# Patient Record
Sex: FEMALE | Race: WHITE | URBAN - METROPOLITAN AREA
[De-identification: names, ages, dates, MRNs, and addresses within clinical notes are randomized per-mention and may not be internally consistent; named-entity substitution may affect disease eponyms.]

---

## 2017-01-13 ENCOUNTER — OFFICE VISIT (OUTPATIENT)
Dept: ORTHOPEDICS | Facility: CLINIC | Age: 47
End: 2017-01-13

## 2017-01-13 VITALS — BODY MASS INDEX: 47.09 KG/M2 | WEIGHT: 293 LBS | HEIGHT: 66 IN

## 2017-01-13 DIAGNOSIS — M75.121 COMPLETE TEAR OF RIGHT ROTATOR CUFF: Primary | ICD-10-CM

## 2017-01-13 ASSESSMENT — ENCOUNTER SYMPTOMS
CHILLS: 0
NIGHT SWEATS: 0
WEIGHT LOSS: 0
POLYDIPSIA: 0
WEIGHT GAIN: 0
INCREASED ENERGY: 0
FATIGUE: 1
HALLUCINATIONS: 0
ALTERED TEMPERATURE REGULATION: 0
FEVER: 0
DECREASED APPETITE: 0
POLYPHAGIA: 0

## 2017-01-13 NOTE — PROGRESS NOTES
[facility], Orthopaedic Surgery Consultation    Karolyn Finney MRN# [id number]   Age: 46 year old YOB: 1970     Reason for consult: Right shoulder pain       Requesting physician: Dr. Sergo Jaffe         Assessment and Plan:   Assessment:   1. Right supraspinatus and subscapularis rotator cuff tear  2. Right long head biceps disease  3. Morbid obesity    Plan: I reviewed imaging and exam findings with the patient. We discussed that her imaging results demonstrated a tear of the right supraspinatus and a portion of the subscapularis tendon. We discussed the natural history of rotator cuff tears including that they do not heal on their own. Despite this, both non-operative and operative treatment options are available. We discussed the risks and benefits of each. We discussed the postoperative restrictions and rehab course. Given that she has not yet tried formal physical therapy for her right shoulder I would advise a trial of PT. If this does not provide relief of her symptoms we could give further consideration to surgical treatment. Patient expressed understanding of our discussion and all questions were answered. The patient was given a referral for physical therapy and will follow-up in 6 weeks.             History of Present Illness:   Patient was seen and examined by me. History, PMH, Meds, SH, complete ROS (10 organ systems) and PE reviewed with patient and prior medical records.      Mrs. Anthony Finney is a 46-year old female who presents with an approximate 8-month history of right shoulder pain. The patient reports falling alongside a pool at a hotel in June of 2016 after which she began to experience discomfort in her right shoulder. She subsequently consulted with Dr. Sergo Jaffe regarding this shoulder discomfort during a post-operative follow-up for her right knee replacement surgery at which point she was referred to [facility] due to scheduling conflicts and  concern over the patient's smoking status, indicated surgery would not be possible until early spring. Ms. Finney is now presenting here with continued pain and discomfort that is felt most acutely while the patient is lying on her right side at night and with daily activities over the patient's shoulders. The patient takes hydrocodone and uses heat towards pain relief with sub-optimal success and is seeking a more longstanding solution. The patient denies numbness or tingling, new weakness more distally on the right extremity, or sensory changes. She states she has stopped smoking.          Past Medical History:     Past Medical History   Diagnosis Date     Childhood asthma      Localized osteoarthrosis not specified whether primary or secondary, lower leg 10/30/11     Hospitalized     Other complications affecting other specified body systems, NEC      Body mass index 45.0-49.9, adult (H)      Left foot drop 2012     wears  brace             Past Surgical History:     Past Surgical History   Procedure Laterality Date     Release plantar fascia  2007     C neurectomy, intrinsic musculature foot  2007     neuroma removal     Cholecystectomy  2004     Elbow surgery  1986     right elbow     Hc knee scope,med/lat menisectomy  10/28/10     LT       10/28/10     LT     C total knee arthroplasty  10/27/11     LT     Hc hysteroscopy w endometrial bx/polypectomy w/wo d&c  7/11/13             Social History:     Social History     Social History     Marital Status:      Spouse Name: N/A     Number of Children: N/A     Years of Education: N/A     Social History Main Topics     Smoking status: Current Every Day Smoker -- 0.50 packs/day for 35 years     Types: Cigarettes     Last Attempt to Quit: 10/15/2011     Smokeless tobacco: Never Used      Comment:  Starting Chantix tomorrow     Alcohol Use: No     Drug Use: No     Sexual Activity:     Partners: Male     Other Topics Concern     None     Social History  "Narrative             Family History:     Family History   Problem Relation Age of Onset     Family History Negative       Arthritis Father      Asthma Brother      DIABETES Brother      Neurologic Disorder Daughter      Epilepsy     Psychotic Disorder Daughter      Bipolar & Toutette     Asthma Brother      Asthma Sister               Medications:     Current Outpatient Prescriptions   Medication Sig     HYDROCODONE-ACETAMINOPHEN PO      fluticasone (FLOVENT HFA) 110 MCG/ACT inhaler Inhale 2 puffs into the lungs 2 times daily.     albuterol (VENTOLIN HFA) 108 (90 BASE) MCG/ACT inhaler Inhale 1-2 puffs into the lungs every 4 hours as needed for shortness of breath / dyspnea. For shortness of breath/cough.     [DISCONTINUED] albuterol (2.5 MG/3ML) 0.083% nebulizer solution Take 3 mLs by nebulization every 6 hours as needed for shortness of breath / dyspnea.     No current facility-administered medications for this visit.             Allergies:      Allergies   Allergen Reactions     Adhesive Tape Rash     Skin reacts to any adhesive     Morphine      Noted in 7/19/09 ER     Penicillins      Noted in 7/19/09 ER            Review of Systems:   A comprehensive 10 point review of systems (constitutional, ENT, cardiac, peripheral vascular, respiratory, GI, , Musculoskeletal, skin, Neurological) was performed and found to be negative except as described in this note.           Physical Exam:      Ht 1.67 m (5' 5.75\")  Wt 144.38 kg (318 lb 4.8 oz)  BMI 51.77 kg/m2     General: Patient was sitting on exam table in no acute distress, well groomed, and sociable      Musculoskeletal: Right upper extremity: No discoloration, swelling, abrasions, scars, or any gross deformity was noted. In relation to the contralateral upper extremity no asymmetry was observed. Due to body habitus it was difficult to assess for muscle mass or evidence of step-offs. Mild tenderness over the bicipital groove and greater tuberosity. No " tenderness elicited over the AC joint or lesser tuberosity. ROM: Active FE to 80 and passive to 90. ER to 45 and IR to L4. FE and ER strength 4/5 and limited by pain. Positive provocative tests with pain on each: Yergasons, Speeds, empty can, Neer and Steen. Sens intact to Ax/Med/Rad/Uln nerves. Motor intact to EPL, FPL, and Intrinsics.              Data:   XR right shoulder today demonstrates slight irregularity at the greater tuberosity suggestive of rotator cuff disease. The glenohumeral joint is otherwise well preserved. There is mild AC degenerative change.    MRI of the right shoulder done in the [facility] on 11/14/16 reveals a full thickness tear of the supraspinatus with moderate retraction. There may be some high grade partial tearing of the upper rolled border of the subscapularis. The study quality is somewhat limited by patient body habitus. There is long head biceps tendinopathy and tearing.     Answers for HPI/ROS submitted by the patient on 1/13/2017   General Symptoms: Yes  Skin Symptoms: No  HENT Symptoms: No  EYE SYMPTOMS: No  HEART SYMPTOMS: No  LUNG SYMPTOMS: No  INTESTINAL SYMPTOMS: No  URINARY SYMPTOMS: No  GYNECOLOGIC SYMPTOMS: No  BREAST SYMPTOMS: No  SKELETAL SYMPTOMS: No  BLOOD SYMPTOMS: No  NERVOUS SYSTEM SYMPTOMS: No  MENTAL HEALTH SYMPTOMS: No  Fever: No  Loss of appetite: No  Weight loss: No  Weight gain: No  Fatigue: Yes  Night sweats: No  Chills: No  Increased stress: No  Excessive hunger: No  Excessive thirst: No  Feeling hot or cold when others believe the temperature is normal: No  Loss of height: No  Post-operative complications: No  Surgical site pain: No  Hallucinations: No  Change in or Loss of Energy: No  Hyperactivity: No  Confusion: No

## 2017-01-13 NOTE — Clinical Note
1/13/2017       RE: Karolyn Finney   [address]  [address]  [facility] [address]     Dear Colleague,    Thank you for referring your patient, Karolyn Finney, to the [facility] at [facility]. Please see a copy of my visit note below.    No notes on file    Again, thank you for allowing me to participate in the care of your patient.      Sincerely,    Daniela Garcia MD

## 2017-01-13 NOTE — NURSING NOTE
"Reason For Visit:   Chief Complaint   Patient presents with     Consult     Pt. states that she is here today for Right Shoulder Rotator Cuff Tear. DOI: 06/2016, she fell at the hotel. Referring:  LEESA FERNANDES       PCP: Torito Mcmullen  Ref: LEESA FERNANDES    ?  No  Occupation: None.  Date of injury: 06/2016, fall  Date of surgery: None  Smoker: No      Left hand dominant    [facility] Shoulder Score Questionnaire     [facility] Shoulder Assessment 1/13/2017   Affected Shoulder Right   On a scale from zero to 100, how would you rate your RIGHT shoulder today (100 being normal) 10   On a scale from zero to 100, how would you rate your LEFT shoulder today (100 being normal) 100              Ht 1.67 m (5' 5.75\")  Wt 144.38 kg (318 lb 4.8 oz)  BMI 51.77 kg/m2      Pain Assessment  Patient Currently in Pain: Yes  0-10 Pain Scale: 8  Primary Pain Location: Shoulder  Pain Orientation: Right  Pain Descriptors: Discomfort, Aching, Sore, Sharp, Shooting  Alleviating Factors: Pain medication, Rest  Aggravating Factors: Movement, Stretching, Bending            "

## 2017-01-13 NOTE — Clinical Note
1/13/2017      RE: Karolyn Finney   [address]  [address]  [facility] [address]       [facility], Orthopaedic Surgery Consultation    Karolyn Finney MRN# [id number]   Age: 46 year old YOB: 1970     Reason for consult: Right shoulder pain       Requesting physician: Dr. Sergo Jaffe         Assessment and Plan:   Assessment:   1. Right supraspinatus and subscapularis rotator cuff tear  2. Right long head biceps disease  3. Morbid obesity    Plan: I reviewed imaging and exam findings with the patient. We discussed that her imaging results demonstrated a tear of the right supraspinatus and a portion of the subscapularis tendon. We discussed the natural history of rotator cuff tears including that they do not heal on their own. Despite this, both non-operative and operative treatment options are available. We discussed the risks and benefits of each. We discussed the postoperative restrictions and rehab course. Given that she has not yet tried formal physical therapy for her right shoulder I would advise a trial of PT. If this does not provide relief of her symptoms we could give further consideration to surgical treatment. Patient expressed understanding of our discussion and all questions were answered. The patient was given a referral for physical therapy and will follow-up in 6 weeks.             History of Present Illness:   Patient was seen and examined by me. History, PMH, Meds, SH, complete ROS (10 organ systems) and PE reviewed with patient and prior medical records.      Mrs. Anthony Finney is a 46-year old female who presents with an approximate 8-month history of right shoulder pain. The patient reports falling alongside a pool at a hotel in June of 2016 after which she began to experience discomfort in her right shoulder. She subsequently consulted with Dr. Sergo Jaffe regarding this shoulder discomfort during a post-operative follow-up for her right knee  replacement surgery at which point she was referred to [facility] where due to scheduling conflicts and concern over the patient's smoking status, indicated surgery would not be possible until early spring. Ms. Finney is now presenting here with continued pain and discomfort that is felt most acutely while the patient is lying on her right side at night and with daily activities over the patient's shoulders. The patient takes hydrocodone and uses heat towards pain relief with sub-optimal success and is seeking a more longstanding solution. The patient denies numbness or tingling, new weakness more distally on the right extremity, or sensory changes. She states she has stopped smoking.          Past Medical History:     Past Medical History   Diagnosis Date     Childhood asthma      Localized osteoarthrosis not specified whether primary or secondary, lower leg 10/30/11     Hospitalized     Other complications affecting other specified body systems, NEC      Body mass index 45.0-49.9, adult (H)      Left foot drop 2012     wears  brace             Past Surgical History:     Past Surgical History   Procedure Laterality Date     Release plantar fascia  2007     C neurectomy, intrinsic musculature foot  2007     neuroma removal     Cholecystectomy  2004     Elbow surgery  1986     right elbow     Hc knee scope,med/lat menisectomy  10/28/10     LT       10/28/10     LT     C total knee arthroplasty  10/27/11     LT     Hc hysteroscopy w endometrial bx/polypectomy w/wo d&c  7/11/13             Social History:     Social History     Social History     Marital Status:      Spouse Name: N/A     Number of Children: N/A     Years of Education: N/A     Social History Main Topics     Smoking status: Current Every Day Smoker -- 0.50 packs/day for 35 years     Types: Cigarettes     Last Attempt to Quit: 10/15/2011     Smokeless tobacco: Never Used      Comment:  Starting Chantix tomorrow     Alcohol Use: No     Drug Use: No  "    Sexual Activity:     Partners: Male     Other Topics Concern     None     Social History Narrative             Family History:     Family History   Problem Relation Age of Onset     Family History Negative       Arthritis Father      Asthma Brother      DIABETES Brother      Neurologic Disorder Daughter      Epilepsy     Psychotic Disorder Daughter      Bipolar & Toutette     Asthma Brother      Asthma Sister               Medications:     Current Outpatient Prescriptions   Medication Sig     HYDROCODONE-ACETAMINOPHEN PO      fluticasone (FLOVENT HFA) 110 MCG/ACT inhaler Inhale 2 puffs into the lungs 2 times daily.     albuterol (VENTOLIN HFA) 108 (90 BASE) MCG/ACT inhaler Inhale 1-2 puffs into the lungs every 4 hours as needed for shortness of breath / dyspnea. For shortness of breath/cough.     [DISCONTINUED] albuterol (2.5 MG/3ML) 0.083% nebulizer solution Take 3 mLs by nebulization every 6 hours as needed for shortness of breath / dyspnea.     No current facility-administered medications for this visit.             Allergies:      Allergies   Allergen Reactions     Adhesive Tape Rash     Skin reacts to any adhesive     Morphine      Noted in 7/19/09 ER     Penicillins      Noted in 7/19/09 ER            Review of Systems:   A comprehensive 10 point review of systems (constitutional, ENT, cardiac, peripheral vascular, respiratory, GI, , Musculoskeletal, skin, Neurological) was performed and found to be negative except as described in this note.           Physical Exam:      Ht 1.67 m (5' 5.75\")  Wt 144.38 kg (318 lb 4.8 oz)  BMI 51.77 kg/m2     General: Patient was sitting on exam table in no acute distress, well groomed, and sociable      Musculoskeletal: Right upper extremity: No discoloration, swelling, abrasions, scars, or any gross deformity was noted. In relation to the contralateral upper extremity no asymmetry was observed. Due to body habitus it was difficult to assess for muscle mass or evidence " of step-offs. Mild tenderness over the bicipital groove and greater tuberosity. No tenderness elicited over the AC joint or lesser tuberosity. ROM: Active FE to 80 and passive to 90. ER to 45 and IR to L4. FE and ER strength 4/5 and limited by pain. Positive provocative tests with pain on each: Yergasons, Speeds, empty can, Neer and Steen. Sens intact to Ax/Med/Rad/Uln nerves. Motor intact to EPL, FPL, and Intrinsics.              Data:   XR right shoulder today demonstrates slight irregularity at the greater tuberosity suggestive of rotator cuff disease. The glenohumeral joint is otherwise well preserved. There is mild AC degenerative change.    MRI of the right shoulder done in the [facility] system on 11/14/16 reveals a full thickness tear of the supraspinatus with moderate retraction. There may be some high grade partial tearing of the upper rolled border of the subscapularis. The study quality is somewhat limited by patient body habitus. There is long head biceps tendinopathy and tearing.     Answers for HPI/ROS submitted by the patient on 1/13/2017   General Symptoms: Yes  Skin Symptoms: No  HENT Symptoms: No  EYE SYMPTOMS: No  HEART SYMPTOMS: No  LUNG SYMPTOMS: No  INTESTINAL SYMPTOMS: No  URINARY SYMPTOMS: No  GYNECOLOGIC SYMPTOMS: No  BREAST SYMPTOMS: No  SKELETAL SYMPTOMS: No  BLOOD SYMPTOMS: No  NERVOUS SYSTEM SYMPTOMS: No  MENTAL HEALTH SYMPTOMS: No  Fever: No  Loss of appetite: No  Weight loss: No  Weight gain: No  Fatigue: Yes  Night sweats: No  Chills: No  Increased stress: No  Excessive hunger: No  Excessive thirst: No  Feeling hot or cold when others believe the temperature is normal: No  Loss of height: No  Post-operative complications: No  Surgical site pain: No  Hallucinations: No  Change in or Loss of Energy: No  Hyperactivity: No  Confusion: No    Daniela Rhonda Garcia MD

## 2017-01-27 ENCOUNTER — TRANSFERRED RECORDS (OUTPATIENT)
Dept: HEALTH INFORMATION MANAGEMENT | Facility: CLINIC | Age: 47
End: 2017-01-27

## 2017-05-24 ENCOUNTER — OFFICE VISIT (OUTPATIENT)
Dept: ORTHOPEDICS | Facility: CLINIC | Age: 47
End: 2017-05-24

## 2017-05-24 VITALS — BODY MASS INDEX: 47.09 KG/M2 | WEIGHT: 293 LBS | HEIGHT: 66 IN

## 2017-05-24 DIAGNOSIS — M75.121 COMPLETE TEAR OF RIGHT ROTATOR CUFF: Primary | ICD-10-CM

## 2017-05-24 NOTE — LETTER
5/24/2017       RE: Karolyn Finney   [address]  [address]  [facility] [address]     Dear Colleague,    Thank you for referring your patient, Karolyn Finney, to the [facility] at [facility]. Please see a copy of my visit note below.    CHIEF COMPLAINT:  Right rotator cuff tear.      HISTORY OF PRESENT ILLNESS:  Ms. Anthony Finney is a 46-year-old left-hand-dominant female whom I am seeing today for her ongoing right shoulder pain.  She has a full-thickness supraspinatus tear, and has tried physical therapy.  When I saw her last in January, she then did 8 weeks of physical therapy, including home exercises.  She feels that she continues to be limited.  She has pain with away-from-body or overhead activities and, in fact, her overhead function is rather limited.  She is interested in considering surgical intervention.  She provides care for her grandson, who is with her again today.      PHYSICAL EXAMINATION:     GENERAL:  On examination today, the patient is a pleasant adult female in no acute distress.  BMI today is 52.16.   RESPIRATIONS:  Even and unlabored.   FOCUSED UPPER EXTREMITY EXAM:  Inspection of the right upper extremity reveals a morbidly obese body habitus.  The patient has tenderness over the anterior shoulder.  She has no focal tenderness over the AC joint.  Her right shoulder active range of motion today is forward elevation to 85 and passive forward elevation to 95.  Her external rotation reaches 55.  She has internal rotation only to the gluteal level.  She has positive provocative tests diffusely, including empty can, Neer, Steen' and Speed's.  She is neurovascularly intact without deficits.      IMAGING:  I reviewed the MRI of the right shoulder dated 11/14/2016.  This study again demonstrates a full-thickness rotator cuff tear of the supraspinatus with mild to moderate atrophy.  There is high-grade partial tearing of  the subscapularis.  There is also tendinopathy and longitudinal tearing of the long head of the biceps.   ASSESSMENT:   1.  Right full-thickness supraspinatus and partial-thickness subscapularis tears.   2.  Right long head biceps disease.   3.  Morbid obesity with a BMI of 52.16.      RECOMMENDATIONS:  I reviewed with Ms. Anthony Finney the MRI findings again.  I discussed with her that when non-operative treatment is not successful in improving pain in the setting of a rotator cuff tear, consideration can be given to surgical intervention.  I discussed with her my concerns for both the medical and surgical risks of a rotator cuff repair given her obesity.  I outlined for her also that a rotator cuff repair is not a guarantee for full range of motion or strength.  We discussed that if surgery were to be pursued, I would include arthroscopic rotator cuff repair and biceps tenotomy.  I advised her that I think it would be reasonable for her to be seen in the PAC Clinic, as airway management and ventilation are more difficult in the setting of morbid obesity.  I also discussed with her the nature of the postoperative restrictions and recovery.  She expressed understanding.  She would like to proceed with surgical intervention.  We will pursue a preop H&P and evaluation in PAC.     Daniela Garcia MD

## 2017-05-24 NOTE — MR AVS SNAPSHOT
"              After Visit Summary   5/24/2017    Karolyn Finney    MRN: [id number]           Patient Information     Date Of Birth          1970        Visit Information        Provider Department      5/24/2017 12:30 PM Daniela Garcia MD [facility]        Today's Diagnoses     Complete tear of right rotator cuff    -  1       Follow-ups after your visit        Who to contact     Please call your clinic at [phone] to:    Ask questions about your health    Make or cancel appointments    Discuss your medicines    Learn about your test results    Speak to your doctor   If you have compliments or concerns about an experience at your clinic, or if you wish to file a complaint, please contact [facility] Patient Relations at [phone] or email us at [email or web address]         Additional Information About Your Visit        MyChart Information     [employer] gives you secure access to your electronic health record. If you see a primary care provider, you can also send messages to your care team and make appointments. If you have questions, please call your primary care clinic.  If you do not have a primary care provider, please call [phone] and they will assist you.      [employer] is an electronic gateway that provides easy, online access to your medical records. With [employer], you can request a clinic appointment, read your test results, renew a prescription or communicate with your care team.     To access your existing account, please contact your [facility] or call [phone] for assistance.        Care EveryWhere ID     This is your Care EveryWhere ID. This could be used by other organizations to access your [facility] medical records  [id number]        Your Vitals Were     Height BMI (Body Mass Index)                1.67 m (5' 5.75\") 52.16 kg/m2           Blood Pressure from Last 3 Encounters: "   03/15/14 138/57   07/10/13 118/70   07/10/13 114/70    Weight from Last 3 Encounters:   05/24/17 (!) 145.5 kg (320 lb 11.2 oz)   01/13/17 (!) 144.4 kg (318 lb 4.8 oz)   07/10/13 136.1 kg (300 lb)              We Performed the Following     Juanita-Operative Worksheet        Primary Care Provider Office Phone # Fax #    Torito Jamil Mcmullen [phone]       [facility] [address]  [place] [address]        Thank you!     Thank you for choosing [facility]  for your care. Our goal is always to provide you with excellent care. Hearing back from our patients is one way we can continue to improve our services. Please take a few minutes to complete the written survey that you may receive in the mail after your visit with us. Thank you!             Your Updated Medication List - Protect others around you: Learn how to safely use, store and throw away your medicines at www.disposemymeds.org.          This list is accurate as of: 5/24/17 11:59 PM.  Always use your most recent med list.                   Brand Name Dispense Instructions for use    albuterol 108 (90 BASE) MCG/ACT Inhaler    VENTOLIN HFA    1 Inhaler    Inhale 1-2 puffs into the lungs every 4 hours as needed for shortness of breath / dyspnea. For shortness of breath/cough.       fluticasone 110 MCG/ACT Inhaler    FLOVENT HFA    1 Inhaler    Inhale 2 puffs into the lungs 2 times daily.       HYDROCODONE-ACETAMINOPHEN PO      Take by mouth every 6 hours as needed

## 2017-05-24 NOTE — NURSING NOTE
"Reason For Visit:   Chief Complaint   Patient presents with     Shoulder Pain     Follow up right shoulder pain.        PCP: Torito Mcmullen  Ref: LEESA FERNANDES     ?  No  Date of injury: 06/2016, fall  Date of surgery: None  Smoker: No        Left hand dominant    SANE score  Affected shoulder: Right  Right shoulder SANE: 25  Left shoulder SANE: 100    Ht 1.67 m (5' 5.75\")  Wt (!) 145.5 kg (320 lb 11.2 oz)  BMI 52.16 kg/m2      Pain Assessment  Patient Currently in Pain: Yes  0-10 Pain Scale: 6  Primary Pain Location: Shoulder  Pain Orientation: Right  Alleviating Factors: Other (comment) (Nothing)  Aggravating Factors: Other (comment), Reaching (Lifting, reaching behind back)        "

## 2017-05-24 NOTE — PROGRESS NOTES
CHIEF COMPLAINT:  Right rotator cuff tear.      HISTORY OF PRESENT ILLNESS:  Ms. Anthony Finney is a 46-year-old left-hand-dominant female whom I am seeing today for her ongoing right shoulder pain.  She has a full-thickness supraspinatus tear, and has tried physical therapy.  When I saw her last in January, she then did 8 weeks of physical therapy, including home exercises.  She feels that she continues to be limited.  She has pain with away-from-body or overhead activities and, in fact, her overhead function is rather limited.  She is interested in considering surgical intervention.  She provides care for her grandson, who is with her again today.      PHYSICAL EXAMINATION:     GENERAL:  On examination today, the patient is a pleasant adult female in no acute distress.  BMI today is 52.16.   RESPIRATIONS:  Even and unlabored.   FOCUSED UPPER EXTREMITY EXAM:  Inspection of the right upper extremity reveals a morbidly obese body habitus.  The patient has tenderness over the anterior shoulder.  She has no focal tenderness over the AC joint.  Her right shoulder active range of motion today is forward elevation to 85 and passive forward elevation to 95.  Her external rotation reaches 55.  She has internal rotation only to the gluteal level.  She has positive provocative tests diffusely, including empty can, Neer, Steen' and Speed's.  She is neurovascularly intact without deficits.      IMAGING:  I reviewed the MRI of the right shoulder dated 11/14/2016.  This study again demonstrates a full-thickness rotator cuff tear of the supraspinatus with mild to moderate atrophy.  There is high-grade partial tearing of the subscapularis.  There is also tendinopathy and longitudinal tearing of the long head of the biceps.      ASSESSMENT:   1.  Right full-thickness supraspinatus and partial-thickness subscapularis tears.   2.  Right long head biceps disease.   3.  Morbid obesity with a BMI of 52.16.      RECOMMENDATIONS:  I  reviewed with Ms. Anthony Reg the MRI findings again.  I discussed with her that when non-operative treatment is not successful in improving pain in the setting of a rotator cuff tear, consideration can be given to surgical intervention.  I discussed with her my concerns for both the medical and surgical risks of a rotator cuff repair given her obesity.  I outlined for her also that a rotator cuff repair is not a guarantee for full range of motion or strength.  We discussed that if surgery were to be pursued, I would include arthroscopic rotator cuff repair and biceps tenotomy.  I advised her that I think it would be reasonable for her to be seen in the PAC Clinic, as airway management and ventilation are more difficult in the setting of morbid obesity.  I also discussed with her the nature of the postoperative restrictions and recovery.  She expressed understanding.  She would like to proceed with surgical intervention.  We will pursue a preop H&P and evaluation in PAC.

## 2017-05-24 NOTE — LETTER
5/24/2017      RE: Karolyn Finney   [address]  [address]  [facility] [address]     CHIEF COMPLAINT:  Right rotator cuff tear.      HISTORY OF PRESENT ILLNESS:  Ms. Anthony Finney is a 46-year-old left-hand-dominant female whom I am seeing today for her ongoing right shoulder pain.  She has a full-thickness supraspinatus tear, and has tried physical therapy.  When I saw her last in January, she then did 8 weeks of physical therapy, including home exercises.  She feels that she continues to be limited.  She has pain with away-from-body or overhead activities and, in fact, her overhead function is rather limited.  She is interested in considering surgical intervention.  She provides care for her grandson, who is with her again today.      PHYSICAL EXAMINATION:     GENERAL:  On examination today, the patient is a pleasant adult female in no acute distress.  BMI today is 52.16.   RESPIRATIONS:  Even and unlabored.   FOCUSED UPPER EXTREMITY EXAM:  Inspection of the right upper extremity reveals a morbidly obese body habitus.  The patient has tenderness over the anterior shoulder.  She has no focal tenderness over the AC joint.  Her right shoulder active range of motion today is forward elevation to 85 and passive forward elevation to 95.  Her external rotation reaches 55.  She has internal rotation only to the gluteal level.  She has positive provocative tests diffusely, including empty can, Neer, Steen' and Speed's.  She is neurovascularly intact without deficits.      IMAGING:  I reviewed the MRI of the right shoulder dated 11/14/2016.  This study again demonstrates a full-thickness rotator cuff tear of the supraspinatus with mild to moderate atrophy.  There is high-grade partial tearing of the subscapularis.  There is also tendinopathy and longitudinal tearing of the long head of the biceps.      ASSESSMENT:   1.  Right full-thickness supraspinatus and partial-thickness subscapularis tears.   2.  Right  long head biceps disease.   3.  Morbid obesity with a BMI of 52.16.   RECOMMENDATIONS:  I reviewed with Ms. Anthony Finney the MRI findings again.  I discussed with her that when non-operative treatment is not successful in improving pain in the setting of a rotator cuff tear, consideration can be given to surgical intervention.  I discussed with her my concerns for both the medical and surgical risks of a rotator cuff repair given her obesity.  I outlined for her also that a rotator cuff repair is not a guarantee for full range of motion or strength.  We discussed that if surgery were to be pursued, I would include arthroscopic rotator cuff repair and biceps tenotomy.  I advised her that I think it would be reasonable for her to be seen in the PAC Clinic, as airway management and ventilation are more difficult in the setting of morbid obesity.  I also discussed with her the nature of the postoperative restrictions and recovery.  She expressed understanding.  She would like to proceed with surgical intervention.  We will pursue a preop H&P and evaluation in PAC.     Daniela Garcia MD

## 2017-05-24 NOTE — NURSING NOTE
Teaching Flowsheet   Relevant Diagnosis: Rotator cuff tear  Teaching Topic: Pre-op for right arthroscopic rotator cuff repair, biceps tenotomy, subacromial decompression - patient will call to schedule     Person(s) involved in teaching:   Patient  Daughter     Motivation Level:  Asks Questions: Yes  Cooperative: Yes  Receptive (willing/able to accept information): Yes  Any cultural factors/Rastafari beliefs that may influence understanding or compliance? No     Patient demonstrates understanding of the following:  Reason for the appointment, diagnosis and treatment plan: Yes  Knowledge of proper use of medications and conditions for which they are ordered (with special attention to potential side effects or drug interactions): Yes  Which situations necessitate calling provider and whom to contact: Yes    PAC for anesthesia (obesity) and H&P   and daughter will assist with transportation and cares after surgery     Proper use and care of sling x 6 weeks (medical equip, care aids, etc.): Yes  Nutritional needs and diet plan: Yes  Pain management techniques: Yes  Wound Care: Yes  How and/when to access community resources: Yes     Instructional Materials Used/Given: Pre-op packet, surgical soap, written guidelines for care after rotator cuff repair     Time spent with patient: 15.

## 2017-06-12 ENCOUNTER — APPOINTMENT (OUTPATIENT)
Dept: SURGERY | Facility: CLINIC | Age: 47
End: 2017-06-12

## 2017-06-12 ENCOUNTER — OFFICE VISIT (OUTPATIENT)
Dept: SURGERY | Facility: CLINIC | Age: 47
End: 2017-06-12

## 2017-06-12 DIAGNOSIS — Z01.818 PREOP GENERAL PHYSICAL EXAM: Primary | ICD-10-CM

## 2017-06-12 DIAGNOSIS — N92.0 MENORRHAGIA: Primary | ICD-10-CM

## 2017-06-13 ENCOUNTER — ANESTHESIA EVENT (OUTPATIENT)
Dept: SURGERY | Facility: CLINIC | Age: 47
End: 2017-06-13

## 2017-06-13 VITALS
SYSTOLIC BLOOD PRESSURE: 155 MMHG | BODY MASS INDEX: 48.82 KG/M2 | HEIGHT: 65 IN | HEART RATE: 96 BPM | WEIGHT: 293 LBS | DIASTOLIC BLOOD PRESSURE: 90 MMHG | TEMPERATURE: 98 F

## 2017-06-13 LAB
ANION GAP SERPL CALCULATED.3IONS-SCNC: 8 MMOL/L (ref 3–14)
BUN SERPL-MCNC: 11 MG/DL (ref 7–30)
CALCIUM SERPL-MCNC: 9.2 MG/DL (ref 8.5–10.1)
CHLORIDE SERPL-SCNC: 105 MMOL/L (ref 94–109)
CO2 SERPL-SCNC: 28 MMOL/L (ref 20–32)
CREAT SERPL-MCNC: 0.71 MG/DL (ref 0.52–1.04)
ERYTHROCYTE [DISTWIDTH] IN BLOOD BY AUTOMATED COUNT: 13.2 % (ref 10–15)
GFR SERPL CREATININE-BSD FRML MDRD: 88 ML/MIN/1.7M2
GLUCOSE SERPL-MCNC: 112 MG/DL (ref 70–99)
HCT VFR BLD AUTO: 46.5 % (ref 35–47)
HGB BLD-MCNC: 14.6 G/DL (ref 11.7–15.7)
MCH RBC QN AUTO: 29.2 PG (ref 26.5–33)
MCHC RBC AUTO-ENTMCNC: 31.4 G/DL (ref 31.5–36.5)
MCV RBC AUTO: 93 FL (ref 78–100)
PLATELET # BLD AUTO: 302 10E9/L (ref 150–450)
POTASSIUM SERPL-SCNC: 3.7 MMOL/L (ref 3.4–5.3)
RBC # BLD AUTO: 5 10E12/L (ref 3.8–5.2)
SODIUM SERPL-SCNC: 141 MMOL/L (ref 133–144)
WBC # BLD AUTO: 7.3 10E9/L (ref 4–11)

## 2017-06-13 ASSESSMENT — LIFESTYLE VARIABLES: TOBACCO_USE: 1

## 2017-06-13 NOTE — H&P
Pre-Operative H & P     CC:  Preoperative exam to assess for increased cardiopulmonary risk while undergoing surgery and anesthesia.    Date of Encounter: 6/12/2017  Primary Care Physician:  Torito Mcmullen    HPI  Karolyn JEAN Anthony Finney is a 46 year old female who presents for pre-operative H & P in preparation for Right Arthroscopic Rotator Cuff Repair, Biceps Tenotomy, Subacromial Decompression  with Dr. Garcia on 7/7/2017 at [facility].     History is obtained from the patient.     Past Medical History  Past Medical History:   Diagnosis Date     Body mass index 50.0-59.9, adult (H)      Intermittent asthma      Left foot drop 2012    wears  brace     Localized osteoarthrosis not specified whether primary or secondary, lower leg 10/30/11    Hospitalized     Other complications affecting other specified body systems, NEC        Past Surgical History  Past Surgical History:   Procedure Laterality Date     C NEURECTOMY, INTRINSIC MUSCULATURE FOOT  2007    neuroma removal     C TOTAL KNEE ARTHROPLASTY  10/27/11    LT     CHOLECYSTECTOMY  2004     ELBOW SURGERY  1986    right elbow       10/28/10    LT     HC HYSTEROSCOPY W ENDOMETRIAL BX/POLYPECTOMY W/WO D&C  7/11/13     HC KNEE SCOPE,MED/LAT MENISECTOMY  10/28/10    LT     RELEASE PLANTAR FASCIA  2007       Hx of Blood transfusions/reactions: none     Hx of abnormal bleeding or anti-platelet use: none    Menstrual history: Patient's last menstrual period was 06/14/2013.: hysterectomy     Steroid use in the last year: none    Personal or FH with difficulty with Anesthesia:  None        Prior to Admission Medications  Current Outpatient Prescriptions   Medication Sig Dispense Refill     HYDROCODONE-ACETAMINOPHEN PO Take by mouth every 6 hours as needed        fluticasone (FLOVENT HFA) 110 MCG/ACT inhaler Inhale 2 puffs into the lungs 2 times daily. 1 Inhaler 11     albuterol (VENTOLIN HFA) 108 (90 BASE) MCG/ACT  inhaler Inhale 1-2 puffs into the lungs every 4 hours as needed for shortness of breath / dyspnea. For shortness of breath/cough. 1 Inhaler 1       Allergies  Allergies   Allergen Reactions     Adhesive Tape Rash     Skin reacts to any adhesive     Morphine      Noted in 7/19/09 ER     Penicillins      Noted in 7/19/09 ER     Tramadol Itching       Social History  Social History     Social History     Marital status:      Spouse name: N/A     Number of children: N/A     Years of education: N/A     Occupational History     Not on file.     Social History Main Topics     Smoking status: Former Smoker     Packs/day: 0.50     Years: 35.00     Types: Cigarettes     Quit date: 4/1/2017     Smokeless tobacco: Never Used      Comment:  Starting Chantix tomorrow     Alcohol use No     Drug use: No     Sexual activity: Yes     Partners: Male     Other Topics Concern     Not on file     Social History Narrative       Family History  Family History   Problem Relation Age of Onset     Family History Negative       Arthritis Father      Asthma Brother      DIABETES Brother      Neurologic Disorder Daughter      Epilepsy     Psychotic Disorder Daughter      Bipolar & Toutette     Asthma Brother      Asthma Sister          Anesthesia Evaluation     . Pt has had prior anesthetic. Type: General and MAC           ROS/MED HX    ENT/Pulmonary:     (+)GURJIT risk factors snores loudly, obese, tobacco use, Past use 0.5 PPD for  packs/day  Intermittent asthma Treatment: Inhaler prn,  , . .    Neurologic:  - neg neurologic ROS     Cardiovascular:  - neg cardiovascular ROS   (+) ----. : . . . :. . No previous cardiac testing       METS/Exercise Tolerance:  3 - Able to walk 1-2 blocks without stopping   Hematologic:  - neg hematologic  ROS       Musculoskeletal:   (+) , , other musculoskeletal- right shoulder pain, impaired gait due to muscle injury      GI/Hepatic:  - neg GI/hepatic ROS       Renal/Genitourinary:  - ROS Renal section  "negative       Endo:     (+) Obesity, .      Psychiatric:  - neg psychiatric ROS       Infectious Disease:  - neg infectious disease ROS       Malignancy:      - no malignancy   Other:    (+) No chance of pregnancy C-spine cleared: N/A, no H/O Chronic Pain,no other significant disability   - neg other ROS         Physical Exam  Normal systems: cardiovascular, pulmonary and dental    Airway   Feasible airway    Dental   Normal      Cardiovascular   Rhythm and rate: regular and normal      Pulmonary    breath sounds clear to auscultation               The complete review of systems is negative other than noted in the HPI or here.   Temp: 98  F (36.7  C) Temp src: Oral BP: 155/90 Pulse: 96               323 lbs 14.4 oz  5' 5\"   Body mass index is 53.9 kg/(m^2).       Physical Exam  Constitutional: Awake, alert, cooperative, no apparent distress, and appears stated age.  Eyes: Pupils equal, round and reactive to light, extra ocular muscles intact, sclera clear, conjunctiva normal.  HENT: Normocephalic, oral pharynx with moist mucus membranes, good dentition. No goiter appreciated.   Respiratory: Clear to auscultation bilaterally, no crackles or wheezing.  Cardiovascular: Regular rate and rhythm, normal S1 and S2, and no murmur noted.  Carotids +2, no bruits. Bilateral LE edema. Palpable pulses to radial  DP and PT arteries.   GI: Normal bowel sounds, soft, non-distended, non-tender, no masses palpated, no hepatosplenomegaly.    Lymph/Hematologic: No cervical lymphadenopathy and no supraclavicular lymphadenopathy.  Genitourinary:  Deferred  Skin: Warm and dry.  No rashes at anticipated surgical site.   Musculoskeletal: Full ROM of neck. There is no redness, warmth, or swelling of the joints.    Neurologic: Awake, alert, oriented to name, place and time. Cranial nerves II-XII are grossly intact. Gait is slow, requires cane for long distances. Right arm weakness  Neuropsychiatric: Calm, cooperative. Normal affect. "     Labs: (personally reviewed)  Lab Results   Component Value Date    WBC 7.3 06/12/2017     Lab Results   Component Value Date    RBC 5.00 06/12/2017     Lab Results   Component Value Date    HGB 14.6 06/12/2017     Lab Results   Component Value Date    HCT 46.5 06/12/2017     No components found for: MCT  Lab Results   Component Value Date    MCV 93 06/12/2017     Lab Results   Component Value Date    MCH 29.2 06/12/2017     Lab Results   Component Value Date    MCHC 31.4 06/12/2017     Lab Results   Component Value Date    RDW 13.2 06/12/2017     Lab Results   Component Value Date     06/12/2017       Last Basic Metabolic Panel:  Lab Results   Component Value Date     06/12/2017      Lab Results   Component Value Date    POTASSIUM 3.7 06/12/2017     Lab Results   Component Value Date    CHLORIDE 105 06/12/2017     Lab Results   Component Value Date    SCARLETT 9.2 06/12/2017     Lab Results   Component Value Date    CO2 28 06/12/2017     Lab Results   Component Value Date    BUN 11 06/12/2017     Lab Results   Component Value Date    CR 0.71 06/12/2017     Lab Results   Component Value Date     06/12/2017       EKG: Personally reviewed but formal cardiology read pending: not indicated          ASSESSMENT and PLAN  Karolyn Finney is a 46 year old female scheduled to undergo Right Arthroscopic Rotator Cuff Repair, Biceps Tenotomy, Subacromial Decompression. She has the following specific operative considerations:   - RCRI : Low serious cardiac risks.  0.4% risk of major adverse cardiac event.   - Anesthesia considerations:  Refer to PAC assessment in anesthesia records  - VTE risk: 0.5% due to BMI  - GURJIT # of risks 4/8 = intermediate risk  - Post-op delirium risk: low risk   - Risk of PONV score = 2.  If > 2, anti-emetic intervention recommended.       Previous anesthesia without complications  1) Cardiac: No known cardiac diagnosis or symptoms.  2) Pulmonary: Intermittent asthma, uses  inhalers in the Spring and Fall. Smoked 0.5 PPD for 15 years, quit 4/2017  3) MSK: ongoing right shoulder pain as a result of Right full-thickness supraspinatus and partial-thickness subscapularis tears  4) Endo: BMI 52.27    Feasible airway. Activity is limited by bilateral LE muscle injury and left foot drop. Will use cane for long distances.    Pt optimized for surgery. AVS with information on surgery time/arrival time, meds and NPO status given by nursing staff      Patient was discussed with Dr Patton.    MILTON Vance CNS  Preoperative Assessment Center  [facility]  Clinic and Surgery Center  Phone: [phone]  Fax: [phone]

## 2017-06-29 ENCOUNTER — ANESTHESIA EVENT (OUTPATIENT)
Dept: SURGERY | Facility: CLINIC | Age: 47
End: 2017-06-29
Payer: MEDICARE

## 2017-07-07 ENCOUNTER — ANESTHESIA (OUTPATIENT)
Dept: SURGERY | Facility: CLINIC | Age: 47
End: 2017-07-07
Payer: MEDICARE

## 2017-07-07 ENCOUNTER — HOSPITAL ENCOUNTER (OUTPATIENT)
Facility: CLINIC | Age: 47
Discharge: HOME | End: 2017-07-07
Attending: ORTHOPAEDIC SURGERY | Admitting: ORTHOPAEDIC SURGERY
Payer: MEDICARE

## 2017-07-07 VITALS
RESPIRATION RATE: 16 BRPM | OXYGEN SATURATION: 100 % | DIASTOLIC BLOOD PRESSURE: 77 MMHG | TEMPERATURE: 98.4 F | BODY MASS INDEX: 48.82 KG/M2 | SYSTOLIC BLOOD PRESSURE: 126 MMHG | HEIGHT: 65 IN | WEIGHT: 293 LBS

## 2017-07-07 DIAGNOSIS — M75.121 COMPLETE TEAR OF RIGHT ROTATOR CUFF: Primary | ICD-10-CM

## 2017-07-07 PROCEDURE — 37000009 ZZH ANESTHESIA TECHNICAL FEE, EACH ADDTL 15 MIN: Performed by: ORTHOPAEDIC SURGERY

## 2017-07-07 PROCEDURE — 25000128 H RX IP 250 OP 636: Performed by: ORTHOPAEDIC SURGERY

## 2017-07-07 PROCEDURE — 36000057 ZZH SURGERY LEVEL 3 1ST 30 MIN - UMMC: Performed by: ORTHOPAEDIC SURGERY

## 2017-07-07 PROCEDURE — 71000014 ZZH RECOVERY PHASE 1 LEVEL 2 FIRST HR: Performed by: ORTHOPAEDIC SURGERY

## 2017-07-07 PROCEDURE — 27110028 ZZH OR GENERAL SUPPLY NON-STERILE: Performed by: ORTHOPAEDIC SURGERY

## 2017-07-07 PROCEDURE — 36000059 ZZH SURGERY LEVEL 3 EA 15 ADDTL MIN UMMC: Performed by: ORTHOPAEDIC SURGERY

## 2017-07-07 PROCEDURE — 25000125 ZZHC RX 250: Performed by: NURSE ANESTHETIST, CERTIFIED REGISTERED

## 2017-07-07 PROCEDURE — 25000128 H RX IP 250 OP 636: Performed by: NURSE ANESTHETIST, CERTIFIED REGISTERED

## 2017-07-07 PROCEDURE — 40000171 ZZH STATISTIC PRE-PROCEDURE ASSESSMENT III: Performed by: ORTHOPAEDIC SURGERY

## 2017-07-07 PROCEDURE — S0077 INJECTION, CLINDAMYCIN PHOSP: HCPCS | Performed by: ORTHOPAEDIC SURGERY

## 2017-07-07 PROCEDURE — 27210995 ZZH RX 272: Performed by: ORTHOPAEDIC SURGERY

## 2017-07-07 PROCEDURE — 25000128 H RX IP 250 OP 636: Performed by: STUDENT IN AN ORGANIZED HEALTH CARE EDUCATION/TRAINING PROGRAM

## 2017-07-07 PROCEDURE — 37000008 ZZH ANESTHESIA TECHNICAL FEE, 1ST 30 MIN: Performed by: ORTHOPAEDIC SURGERY

## 2017-07-07 PROCEDURE — 25000128 H RX IP 250 OP 636: Performed by: ANESTHESIOLOGY

## 2017-07-07 PROCEDURE — 27210794 ZZH OR GENERAL SUPPLY STERILE: Performed by: ORTHOPAEDIC SURGERY

## 2017-07-07 PROCEDURE — 25000125 ZZHC RX 250: Performed by: ORTHOPAEDIC SURGERY

## 2017-07-07 PROCEDURE — C1713 ANCHOR/SCREW BN/BN,TIS/BN: HCPCS | Performed by: ORTHOPAEDIC SURGERY

## 2017-07-07 PROCEDURE — C9290 INJ, BUPIVACAINE LIPOSOME: HCPCS | Performed by: STUDENT IN AN ORGANIZED HEALTH CARE EDUCATION/TRAINING PROGRAM

## 2017-07-07 PROCEDURE — 71000027 ZZH RECOVERY PHASE 2 EACH 15 MINS: Performed by: ORTHOPAEDIC SURGERY

## 2017-07-07 PROCEDURE — 25000125 ZZHC RX 250: Performed by: STUDENT IN AN ORGANIZED HEALTH CARE EDUCATION/TRAINING PROGRAM

## 2017-07-07 DEVICE — IMP ANCHOR ARTHREX BIO-SWIVELOCK 4.75X22MM AR-2324BCC-2: Type: IMPLANTABLE DEVICE | Site: SHOULDER | Status: FUNCTIONAL

## 2017-07-07 RX ORDER — ALBUTEROL SULFATE 0.83 MG/ML
2.5 SOLUTION RESPIRATORY (INHALATION) EVERY 4 HOURS PRN
Status: DISCONTINUED | OUTPATIENT
Start: 2017-07-07 | End: 2017-07-07 | Stop reason: HOSPADM

## 2017-07-07 RX ORDER — ONDANSETRON 2 MG/ML
INJECTION INTRAMUSCULAR; INTRAVENOUS PRN
Status: DISCONTINUED | OUTPATIENT
Start: 2017-07-07 | End: 2017-07-07

## 2017-07-07 RX ORDER — CLINDAMYCIN PHOSPHATE 900 MG/50ML
900 INJECTION, SOLUTION INTRAVENOUS
Status: COMPLETED | OUTPATIENT
Start: 2017-07-07 | End: 2017-07-07

## 2017-07-07 RX ORDER — FLUMAZENIL 0.1 MG/ML
0.2 INJECTION, SOLUTION INTRAVENOUS
Status: DISCONTINUED | OUTPATIENT
Start: 2017-07-07 | End: 2017-07-07 | Stop reason: HOSPADM

## 2017-07-07 RX ORDER — FENTANYL CITRATE 50 UG/ML
25-50 INJECTION, SOLUTION INTRAMUSCULAR; INTRAVENOUS
Status: DISCONTINUED | OUTPATIENT
Start: 2017-07-07 | End: 2017-07-07 | Stop reason: HOSPADM

## 2017-07-07 RX ORDER — MEPERIDINE HYDROCHLORIDE 25 MG/ML
12.5 INJECTION INTRAMUSCULAR; INTRAVENOUS; SUBCUTANEOUS
Status: DISCONTINUED | OUTPATIENT
Start: 2017-07-07 | End: 2017-07-07 | Stop reason: HOSPADM

## 2017-07-07 RX ORDER — PHYSOSTIGMINE SALICYLATE 1 MG/ML
1.2 INJECTION INTRAVENOUS
Status: DISCONTINUED | OUTPATIENT
Start: 2017-07-07 | End: 2017-07-07 | Stop reason: HOSPADM

## 2017-07-07 RX ORDER — ONDANSETRON 4 MG/1
4-8 TABLET, ORALLY DISINTEGRATING ORAL EVERY 8 HOURS PRN
Qty: 4 TABLET | Refills: 0 | Status: SHIPPED | OUTPATIENT
Start: 2017-07-07 | End: 2017-10-18

## 2017-07-07 RX ORDER — SODIUM CHLORIDE, SODIUM LACTATE, POTASSIUM CHLORIDE, CALCIUM CHLORIDE 600; 310; 30; 20 MG/100ML; MG/100ML; MG/100ML; MG/100ML
INJECTION, SOLUTION INTRAVENOUS CONTINUOUS
Status: DISCONTINUED | OUTPATIENT
Start: 2017-07-07 | End: 2017-07-07 | Stop reason: HOSPADM

## 2017-07-07 RX ORDER — OXYCODONE HYDROCHLORIDE 5 MG/1
5-10 TABLET ORAL
Status: DISCONTINUED | OUTPATIENT
Start: 2017-07-07 | End: 2017-07-07 | Stop reason: HOSPADM

## 2017-07-07 RX ORDER — SODIUM CHLORIDE, SODIUM LACTATE, POTASSIUM CHLORIDE, CALCIUM CHLORIDE 600; 310; 30; 20 MG/100ML; MG/100ML; MG/100ML; MG/100ML
INJECTION, SOLUTION INTRAVENOUS CONTINUOUS PRN
Status: DISCONTINUED | OUTPATIENT
Start: 2017-07-07 | End: 2017-07-07

## 2017-07-07 RX ORDER — ONDANSETRON 4 MG/1
4 TABLET, ORALLY DISINTEGRATING ORAL
Status: DISCONTINUED | OUTPATIENT
Start: 2017-07-07 | End: 2017-07-07 | Stop reason: HOSPADM

## 2017-07-07 RX ORDER — LIDOCAINE 40 MG/G
CREAM TOPICAL
Status: DISCONTINUED | OUTPATIENT
Start: 2017-07-07 | End: 2017-07-07 | Stop reason: HOSPADM

## 2017-07-07 RX ORDER — GLYCOPYRROLATE 0.2 MG/ML
INJECTION, SOLUTION INTRAMUSCULAR; INTRAVENOUS PRN
Status: DISCONTINUED | OUTPATIENT
Start: 2017-07-07 | End: 2017-07-07

## 2017-07-07 RX ORDER — ONDANSETRON 4 MG/1
4 TABLET, ORALLY DISINTEGRATING ORAL EVERY 30 MIN PRN
Status: DISCONTINUED | OUTPATIENT
Start: 2017-07-07 | End: 2017-07-07 | Stop reason: HOSPADM

## 2017-07-07 RX ORDER — ACETAMINOPHEN 325 MG/1
650 TABLET ORAL EVERY 4 HOURS PRN
Qty: 100 TABLET | Refills: 0 | COMMUNITY
Start: 2017-07-07 | End: 2017-10-18

## 2017-07-07 RX ORDER — OXYCODONE HYDROCHLORIDE 5 MG/1
5-10 TABLET ORAL EVERY 4 HOURS PRN
Qty: 60 TABLET | Refills: 0 | Status: SHIPPED | OUTPATIENT
Start: 2017-07-07 | End: 2017-07-19

## 2017-07-07 RX ORDER — ACETAMINOPHEN 325 MG/1
650 TABLET ORAL
Status: DISCONTINUED | OUTPATIENT
Start: 2017-07-07 | End: 2017-07-07 | Stop reason: HOSPADM

## 2017-07-07 RX ORDER — FENTANYL CITRATE 50 UG/ML
INJECTION, SOLUTION INTRAMUSCULAR; INTRAVENOUS PRN
Status: DISCONTINUED | OUTPATIENT
Start: 2017-07-07 | End: 2017-07-07

## 2017-07-07 RX ORDER — DEXAMETHASONE SODIUM PHOSPHATE 4 MG/ML
4 INJECTION, SOLUTION INTRA-ARTICULAR; INTRALESIONAL; INTRAMUSCULAR; INTRAVENOUS; SOFT TISSUE EVERY 10 MIN PRN
Status: DISCONTINUED | OUTPATIENT
Start: 2017-07-07 | End: 2017-07-07 | Stop reason: HOSPADM

## 2017-07-07 RX ORDER — HYDRALAZINE HYDROCHLORIDE 20 MG/ML
2.5-5 INJECTION INTRAMUSCULAR; INTRAVENOUS EVERY 10 MIN PRN
Status: DISCONTINUED | OUTPATIENT
Start: 2017-07-07 | End: 2017-07-07 | Stop reason: HOSPADM

## 2017-07-07 RX ORDER — BUPIVACAINE HYDROCHLORIDE AND EPINEPHRINE 5; 5 MG/ML; UG/ML
INJECTION, SOLUTION PERINEURAL PRN
Status: DISCONTINUED | OUTPATIENT
Start: 2017-07-07 | End: 2017-07-07

## 2017-07-07 RX ORDER — PROPOFOL 10 MG/ML
INJECTION, EMULSION INTRAVENOUS PRN
Status: DISCONTINUED | OUTPATIENT
Start: 2017-07-07 | End: 2017-07-07

## 2017-07-07 RX ORDER — NALOXONE HYDROCHLORIDE 0.4 MG/ML
.1-.4 INJECTION, SOLUTION INTRAMUSCULAR; INTRAVENOUS; SUBCUTANEOUS
Status: DISCONTINUED | OUTPATIENT
Start: 2017-07-07 | End: 2017-07-07 | Stop reason: HOSPADM

## 2017-07-07 RX ORDER — LIDOCAINE HYDROCHLORIDE 20 MG/ML
INJECTION, SOLUTION INFILTRATION; PERINEURAL PRN
Status: DISCONTINUED | OUTPATIENT
Start: 2017-07-07 | End: 2017-07-07

## 2017-07-07 RX ORDER — AMOXICILLIN 250 MG
1-2 CAPSULE ORAL 2 TIMES DAILY
Qty: 30 TABLET | Refills: 0 | Status: SHIPPED | OUTPATIENT
Start: 2017-07-07 | End: 2017-10-18

## 2017-07-07 RX ORDER — CLINDAMYCIN PHOSPHATE 900 MG/50ML
900 INJECTION, SOLUTION INTRAVENOUS SEE ADMIN INSTRUCTIONS
Status: DISCONTINUED | OUTPATIENT
Start: 2017-07-07 | End: 2017-07-07 | Stop reason: HOSPADM

## 2017-07-07 RX ORDER — ONDANSETRON 2 MG/ML
4 INJECTION INTRAMUSCULAR; INTRAVENOUS EVERY 30 MIN PRN
Status: DISCONTINUED | OUTPATIENT
Start: 2017-07-07 | End: 2017-07-07 | Stop reason: HOSPADM

## 2017-07-07 RX ADMIN — PHENYLEPHRINE HYDROCHLORIDE 100 MCG: 10 INJECTION, SOLUTION INTRAMUSCULAR; INTRAVENOUS; SUBCUTANEOUS at 14:08

## 2017-07-07 RX ADMIN — BUPIVACAINE HYDROCHLORIDE AND EPINEPHRINE BITARTRATE 15 ML: 5; .005 INJECTION, SOLUTION PERINEURAL at 12:25

## 2017-07-07 RX ADMIN — FENTANYL CITRATE 50 MCG: 50 INJECTION, SOLUTION INTRAMUSCULAR; INTRAVENOUS at 12:55

## 2017-07-07 RX ADMIN — MIDAZOLAM HYDROCHLORIDE 2 MG: 1 INJECTION, SOLUTION INTRAMUSCULAR; INTRAVENOUS at 12:49

## 2017-07-07 RX ADMIN — PHENYLEPHRINE HYDROCHLORIDE 100 MCG: 10 INJECTION, SOLUTION INTRAMUSCULAR; INTRAVENOUS; SUBCUTANEOUS at 13:45

## 2017-07-07 RX ADMIN — CLINDAMYCIN PHOSPHATE 900 MG: 18 INJECTION, SOLUTION INTRAVENOUS at 13:13

## 2017-07-07 RX ADMIN — BUPIVACAINE 10 ML: 13.3 INJECTION, SUSPENSION, LIPOSOMAL INFILTRATION at 12:25

## 2017-07-07 RX ADMIN — Medication 160 MG: at 12:55

## 2017-07-07 RX ADMIN — ONDANSETRON 4 MG: 2 INJECTION INTRAMUSCULAR; INTRAVENOUS at 14:30

## 2017-07-07 RX ADMIN — SODIUM CHLORIDE, POTASSIUM CHLORIDE, SODIUM LACTATE AND CALCIUM CHLORIDE: 600; 310; 30; 20 INJECTION, SOLUTION INTRAVENOUS at 12:45

## 2017-07-07 RX ADMIN — GLYCOPYRROLATE 0.2 MG: 0.2 INJECTION, SOLUTION INTRAMUSCULAR; INTRAVENOUS at 12:55

## 2017-07-07 RX ADMIN — PHENYLEPHRINE HYDROCHLORIDE 100 MCG: 10 INJECTION, SOLUTION INTRAMUSCULAR; INTRAVENOUS; SUBCUTANEOUS at 13:37

## 2017-07-07 RX ADMIN — MIDAZOLAM HYDROCHLORIDE 2 MG: 1 INJECTION, SOLUTION INTRAMUSCULAR; INTRAVENOUS at 12:21

## 2017-07-07 RX ADMIN — SODIUM CHLORIDE, POTASSIUM CHLORIDE, SODIUM LACTATE AND CALCIUM CHLORIDE: 600; 310; 30; 20 INJECTION, SOLUTION INTRAVENOUS at 14:06

## 2017-07-07 RX ADMIN — PROPOFOL 30 MG: 10 INJECTION, EMULSION INTRAVENOUS at 14:26

## 2017-07-07 RX ADMIN — ONDANSETRON 4 MG: 2 INJECTION INTRAMUSCULAR; INTRAVENOUS at 15:09

## 2017-07-07 RX ADMIN — FENTANYL CITRATE 50 MCG: 50 INJECTION, SOLUTION INTRAMUSCULAR; INTRAVENOUS at 13:03

## 2017-07-07 RX ADMIN — LIDOCAINE HYDROCHLORIDE 100 MG: 20 INJECTION, SOLUTION INFILTRATION; PERINEURAL at 12:55

## 2017-07-07 RX ADMIN — FENTANYL CITRATE 50 MCG: 50 INJECTION, SOLUTION INTRAMUSCULAR; INTRAVENOUS at 12:21

## 2017-07-07 RX ADMIN — PHENYLEPHRINE HYDROCHLORIDE 100 MCG: 10 INJECTION, SOLUTION INTRAMUSCULAR; INTRAVENOUS; SUBCUTANEOUS at 13:24

## 2017-07-07 RX ADMIN — PROPOFOL 190 MG: 10 INJECTION, EMULSION INTRAVENOUS at 12:55

## 2017-07-07 RX ADMIN — PHENYLEPHRINE HYDROCHLORIDE 100 MCG: 10 INJECTION, SOLUTION INTRAMUSCULAR; INTRAVENOUS; SUBCUTANEOUS at 13:58

## 2017-07-07 ASSESSMENT — LIFESTYLE VARIABLES: TOBACCO_USE: 1

## 2017-07-07 NOTE — IP AVS SNAPSHOT
[place] [id number] [facility] [address]    Phone:  [phone]                                       After Visit Summary   7/7/2017    Karolyn Finney    MRN: [id number]           After Visit Summary Signature Page     I have received my discharge instructions, and my questions have been answered. I have discussed any challenges I see with this plan with the nurse or doctor.    ..........................................................................................................................................  Patient/Patient Representative Signature      ..........................................................................................................................................  Patient Representative Print Name and Relationship to Patient    ..................................................               ................................................  Date                                            Time    ..........................................................................................................................................  Reviewed by Signature/Title    ...................................................              ..............................................  Date                                                            Time

## 2017-07-07 NOTE — ANESTHESIA PROCEDURE NOTES
Peripheral Nerve Block Procedure Note    Staff:     Anesthesiologist:  IAN VAUGHAN  Location: Pre-op  Procedure Start/Stop TImes:      7/7/2017 12:21 PM     7/7/2017 12:30 PM    patient identified, IV checked, site marked, risks and benefits discussed, informed consent, monitors and equipment checked, pre-op evaluation, at physician/surgeon's request and post-op pain management      Correct Patient: Yes      Correct Position: Yes      Correct Site: Yes      Correct Procedure: Yes      Correct Laterality:  Yes    Site Marked:  Yes  Procedure details:     Procedure:  Interscalene    ASA:  3    Diagnosis:  Right rotator repair    Laterality:  Right    Position:  Supine and Sitting    Sterile Prep: chloraprep, mask and sterile gloves      Local skin infiltration:  None    Needle:  Short bevel    Needle gauge:  21    Needle length (mm):  100    Ultrasound: Yes      Ultrasound used to identify targeted nerve, plexus, or vascular structure and placed a needle adjacent to it      Permanent Image entered into patiient's record      Abnormal pain on injection: No      Blood Aspirated: No      Paresthesias:  No    Bleeding at site: No      Bolus via:  Needle    Infusion Method:  Single Shot    Complications:  None  Assessment/Narrative:     Injection made incrementally with aspirations every (mL):  3     Discussed with Patient Off-Label use of Liposomal Bupivacaine (Exparel) for Nerve Block.    Relevant risks & benefits were discussed with patient.    All questions were answered and there was agreement to proceed.    Patient signed Off-Label Use of Exparel Consent Form.

## 2017-07-07 NOTE — BRIEF OP NOTE
[facility], [place]    Orthopaedic Surgery  Brief Operative Note    Pre-operative diagnosis: Rotator Cuff Tear Right Shoulder    Post-operative diagnosis Right full thickness rotator cuff tear, long head biceps disease   Procedure: Procedure(s):  Right Arthroscopic Rotator Cuff Repair, Biceps Tenotomy, Subacromial Decompression   - Wound Class: I-Clean   Surgeon: Daniela Garcia MD   Assistants(s): Venkatesh Pimentel MD   Anesthesia: Combined General with Block    Estimated blood loss: Minimal   Total IV fluids: (See anesthesia record)   Blood transfusion: (See anesthesia record)   Total urine output: (See anesthesia record)   Drains: None   Specimens: * No specimens in log *   Findings: None   Complications: None   Implants: Arthrex 4.75 SwivelLock x 4

## 2017-07-07 NOTE — ANESTHESIA PREPROCEDURE EVALUATION
Anesthesia Evaluation     . Pt has had prior anesthetic. Type: General    No history of anesthetic complications          ROS/MED HX    ENT/Pulmonary:     (+)tobacco use, Past use Intermittent asthma Treatment: Inhaler prn,  , . .    Neurologic:  - neg neurologic ROS     Cardiovascular:  - neg cardiovascular ROS       METS/Exercise Tolerance:     Hematologic:  - neg hematologic  ROS       Musculoskeletal: Comment: Shoulder injury        GI/Hepatic:  - neg GI/hepatic ROS       Renal/Genitourinary:  - ROS Renal section negative       Endo:  - neg endo ROS   (+) Obesity, .      Psychiatric:  - neg psychiatric ROS       Infectious Disease:  - neg infectious disease ROS       Malignancy:      - no malignancy   Other:    (+) No chance of pregnancy   - neg other ROS                 Physical Exam  Normal systems: cardiovascular, pulmonary and dental    Airway   Mallampati: I  TM distance: >3 FB  Neck ROM: full    Dental     Cardiovascular       Pulmonary    breath sounds clear to auscultation                    Anesthesia Plan      History & Physical Review  History and physical reviewed and following examination; no interval change.    ASA Status:  3 .    NPO Status:  > 8 hours    Plan for General, RSI and ETT with Intravenous and Propofol induction. Maintenance will be Balanced.    PONV prophylaxis:  Ondansetron (or other 5HT-3) and Dexamethasone or Solumedrol  Additional equipment: Videolaryngoscope Airway appears adequate but will have C Mac available.      Postoperative Care  Postoperative pain management:  Multi-modal analgesia and Peripheral nerve block (Single Shot).      Consents                          .

## 2017-07-07 NOTE — OR NURSING
"Dr. Rendon to bedside, notified of pt c/o \"feeling like I can't get a deep breath.\" Encouraged to deep breath and cough, will get up to chair.  "

## 2017-07-07 NOTE — PROGRESS NOTES
S. Patient was seen today, at [facility] preop for measurements/delivery of an Ultrasling IV for right shoulder as ordered by Dr. Garcia.    O/goal. To reduce motion and help with post-op support    A. At this time, I have provided patient with a size medium Don Becky Ultrasling IV. Straps were trimmed and adjusted to achieve a custom fit for the patient. Brace was then left at the surgery control desk for physicians to place on the patient in the OR     P. Patient/staffing have been instructed to contact our facility with any future questions and/or concerns.    Sonam Ultrasling IV Instructions

## 2017-07-07 NOTE — ANESTHESIA CARE TRANSFER NOTE
Patient: Karolyn Finney    Procedure(s):  Right Arthroscopic Rotator Cuff Repair, Biceps Tenotomy, Subacromial Decompression   - Wound Class: I-Clean    Diagnosis: Rotator Cuff Tear Right Shoulder   Diagnosis Additional Information: No value filed.    Anesthesia Type:   General, RSI, ETT     Note:  Airway :Face Mask  Patient transferred to:PACU  Comments: Pt to PACU, spontaneous rr, FA 02, vss, report given to RN      Vitals: (Last set prior to Anesthesia Care Transfer)    CRNA VITALS  7/7/2017 1426 - 7/7/2017 1503      7/7/2017             Resp Rate (set): 10                Electronically Signed By: MILTON Benedict CRNA  July 7, 2017  3:03 PM

## 2017-07-07 NOTE — IP AVS SNAPSHOT
MRN:[id number]                      After Visit Summary   7/7/2017    Karolyn Finney    MRN: [id number]           Thank you!     Thank you for choosing [facility] for your care. Our goal is always to provide you with excellent care. Hearing back from our patients is one way we can continue to improve our services. Please take a few minutes to complete the written survey that you may receive in the mail after you visit with us. Thank you!        Patient Information     Date Of Birth          1970        About your hospital stay     You were admitted on:  July 7, 2017 You last received care in the:  [facility] PACU    You were discharged on:  July 7, 2017       Who to Call     For medical emergencies, please call 911.  For non-urgent questions about your medical care, please call your primary care provider or clinic, [phone]  For questions related to your surgery, please call your surgery clinic        Attending Provider     Provider Daniela Felton MD Orthopedics       Primary Care Provider Office Phone # Fax #    Torito Jamil Mcmullen [phone]      After Care Instructions      Diet as Tolerated       Return to diet before surgery, unless instructed otherwise.            Discharge Instructions       Review outpatient procedure discharge instructions with patient as directed by Provider            Ice to affected area       Ice pack to surgical site every 15 minutes per hour for 24 hours            No weight bearing           Remove dressing - at 72 hours            Return to clinic       Return to clinic in 2 weeks            Wound care       Do not immerse wound in water until sutures removed                  Your next 10 appointments already scheduled     Jul 19, 2017  1:45 PM CDT   (Arrive by 1:30 PM)   RETURN SHOULDER with Daniela Garcia MD   [facility] ([facility])    [address] [place] [address]  Floor  [facility] [id number] [phone]            Aug 16, 2017  1:45 PM CDT   (Arrive by 1:30 PM)   RETURN SHOULDER with Daniela Garcia MD   [facility] ([facility])    [address] [place] [address]  4th [facility] [id number]   [phone]              Further instructions from your care team       [facility]   Adult Discharge Orders & Instructions     For 24 hours after surgery    1. Get plenty of rest.  A responsible adult must stay with you for at least 24 hours after you leave the hospital.   2. Do not drive or use heavy equipment.  If you have weakness or tingling, don't drive or use heavy equipment until this feeling goes away.  3. Do not drink alcohol.  4. Avoid strenuous or risky activities.  Ask for help when climbing stairs.   5. You may feel lightheaded.  IF so, sit for a few minutes before standing.  Have someone help you get up.   6. If you have nausea (feel sick to your stomach): Drink only clear liquids such as apple juice, ginger ale, broth or 7-Up.  Rest may also help.  Be sure to drink enough fluids.  Move to a regular diet as you feel able.  7. You may have a slight fever. Call the doctor if your fever is over 100 F (37.7 C) (taken under the tongue) or lasts longer than 24 hours.  8. You may have a dry mouth, a sore throat, muscle aches or trouble sleeping.  These should go away after 24 hours.  9. Do not make important or legal decisions.   Call your doctor for any of the followin.  Signs of infection (fever, growing tenderness at the surgery site, a large amount of drainage or bleeding, severe pain, foul-smelling drainage, redness, swelling).    2. It has been over 8 to 10 hours since surgery and you are still not able to urinate (pass water).    3.  Headache for over 24 hours.    4.  Numbness, tingling or weakness the day after surgery (if you had spinal anesthesia).  To contact a doctor, call  ________________________________________        Discharge Instructions: Arthroscopy of the Shoulder    Diet    You have no restrictions in your diet.    During the evening following surgery, drink plenty of fluids and eat a light supper.   Nausea    The anesthesia you received may produce some nausea.     If nauseated, stay in bed, keep your head down, and try drinking fluids such as 7-up, tea, or soup.  Discomfort    The amount of discomfort you feel is very unpredictable.    If you have pain that cannot be controlled with the prescription you may have been given, you should notify your doctor.     Some residual swelling and discomfort may occur for one or two weeks.    Applying an ice pack for 10 minutes at a time may help relieve pain and reduce swelling.     When applying the ice, be sure your shoulder dressing does not get wet. You can place plastic over the shoulder prior to applying the ice pack.   Drainage    You may expect drainage from the incisions on your shoulder.    Change the outer dressing in 3 days.    You may change the Band-Aids if they get soiled or wet. If you have white steri-strips across the incisions, leave them in place. They will fall off on their own in 7-10 days.   Activity    You can move your shoulder as tolerated after surgery.     Wear sling if instructed by your doctor.    No active sports, rotating of the shoulder or heavy lifting are advised for one week after surgery.    You may not drive the day of surgery because of the effects of anesthesia.     You can go back to work or school provided no problems such as significant increase or pain/swelling arise.     You may shower the day after surgery. Do not rub the incisions and pat dry.  Call your doctor if:  You have a large amount of bleeding drainage or severe pain.                                                                                                                                                                          "                                                                                                                                  REV.5/12          Discharge Instructions: Following Surgery on your Arm or Hand    Comfort:    Keep the affected arm or hand elevated to reduce pain and swelling. Use pillows at night and a sling (if ordered) during the day.    Take the pain medication as ordered.     Care:    Keep the dressing clean, dry and intact.    Watch for drainage    Check color, motion, and sensation of the fingers/hand on a regular basis.     Activity:    Spend a quiet afternoon and evening at home on the day of your surgery.    No tub baths or showers until your dressing/cast is removed by your doctor.     Report to your doctor at once if:    Your fingers below the dressing/cast are numb, difficult or painful to move, and this is not relieved after elevation.    There is a change in the color of your fingers below the dressing/cast that does not go away when elevated.     The affected arm/hand is much cooler or warmer than the other side.    Your temperature is elevated.    There is an odor or unusual drainage on the dressing/cast.    Your dressing/cast is uncomfortably snug or tight.     Follow up:    Call your doctor s office to schedule a follow-up appointment     Rev. 3/2014          Pending Results     No orders found from 7/5/2017 to 7/8/2017.            Admission Information     Date & Time Provider Department Dept. Phone    7/7/2017 Daniela Garcia MD [facility] PACU [phone]      Your Vitals Were     Blood Pressure Temperature Respirations Height Weight Last Period    126/65 98.4  F (36.9  C) (Oral) 16 1.651 m (5' 5\") 145.2 kg (320 lb) 06/14/2013    Pulse Oximetry BMI (Body Mass Index)                100% 53.25 kg/m2          [employer] Information     [employer] gives you secure access to your electronic health record. If you see a primary care provider, you can also send messages to your care team " and make appointments. If you have questions, please call your primary care clinic.  If you do not have a primary care provider, please call [phone] and they will assist you.        Care EveryWhere ID     This is your Care EveryWhere ID. This could be used by other organizations to access your [facility] medical records  [id number]        Equal Access to Services     ALEKSANDR MELCHOR : Hadii yariel gavin hadcharlene Soedgard, waaxda luqadaha, qaybta kaalmada florencia, aaron robertsonharmanrobbie wen . So [facility] [phone].    ATENCIÓN: Si habla español, tiene a james disposición servicios gratuitos de asistencia lingüística. Sobia al [phone].    We comply with applicable federal civil rights laws and [place] laws. We do not discriminate on the basis of race, color, national origin, age, disability sex, sexual orientation or gender identity.               Review of your medicines      START taking        Dose / Directions    acetaminophen 325 MG tablet   Commonly known as:  TYLENOL   Used for:  Complete tear of right rotator cuff        Dose:  650 mg   Take 2 tablets (650 mg) by mouth every 4 hours as needed for other (mild pain)   Quantity:  100 tablet   Refills:  0       ondansetron 4 MG ODT tab   Commonly known as:  ZOFRAN-ODT   Used for:  Complete tear of right rotator cuff        Dose:  4-8 mg   Take 1-2 tablets (4-8 mg) by mouth every 8 hours as needed for nausea Dissolve ON the tongue.   Quantity:  4 tablet   Refills:  0       oxyCODONE 5 MG IR tablet   Commonly known as:  ROXICODONE   Used for:  Complete tear of right rotator cuff        Dose:  5-10 mg   Take 1-2 tablets (5-10 mg) by mouth every 4 hours as needed for pain or other (Moderate to Severe)   Quantity:  60 tablet   Refills:  0       senna-docusate 8.6-50 MG per tablet   Commonly known as:  SENOKOT-S;PERICOLACE   Used for:  Complete tear of right rotator cuff        Dose:  1-2 tablet   Take 1-2 tablets by mouth 2 times daily Take while on oral  narcotics to prevent or treat constipation.   Quantity:  30 tablet   Refills:  0         CONTINUE these medicines which have NOT CHANGED        Dose / Directions    albuterol 108 (90 BASE) MCG/ACT Inhaler   Commonly known as:  VENTOLIN HFA   Used for:  Moderate persistent asthma        Dose:  1-2 puff   Inhale 1-2 puffs into the lungs every 4 hours as needed for shortness of breath / dyspnea. For shortness of breath/cough.   Quantity:  1 Inhaler   Refills:  1       fluticasone 110 MCG/ACT Inhaler   Commonly known as:  FLOVENT HFA   Used for:  Moderate persistent asthma        Dose:  2 puff   Inhale 2 puffs into the lungs 2 times daily.   Quantity:  1 Inhaler   Refills:  11            Where to get your medicines      These medications were sent to [facility] [place], [place] - [address]  [address] [address]     Phone:  [phone]     ondansetron 4 MG ODT tab    senna-docusate 8.6-50 MG per tablet         Some of these will need a paper prescription and others can be bought over the counter. Ask your nurse if you have questions.     Bring a paper prescription for each of these medications     oxyCODONE 5 MG IR tablet       You don't need a prescription for these medications     acetaminophen 325 MG tablet                Protect others around you: Learn how to safely use, store and throw away your medicines at www.disposemymeds.org.             Medication List: This is a list of all your medications and when to take them. Check marks below indicate your daily home schedule. Keep this list as a reference.      Medications           Morning Afternoon Evening Bedtime As Needed    acetaminophen 325 MG tablet   Commonly known as:  TYLENOL   Take 2 tablets (650 mg) by mouth every 4 hours as needed for other (mild pain)                                albuterol 108 (90 BASE) MCG/ACT Inhaler   Commonly known as:  VENTOLIN HFA   Inhale 1-2 puffs into the lungs every 4 hours as  needed for shortness of breath / dyspnea. For shortness of breath/cough.                                fluticasone 110 MCG/ACT Inhaler   Commonly known as:  FLOVENT HFA   Inhale 2 puffs into the lungs 2 times daily.                                ondansetron 4 MG ODT tab   Commonly known as:  ZOFRAN-ODT   Take 1-2 tablets (4-8 mg) by mouth every 8 hours as needed for nausea Dissolve ON the tongue.                                oxyCODONE 5 MG IR tablet   Commonly known as:  ROXICODONE   Take 1-2 tablets (5-10 mg) by mouth every 4 hours as needed for pain or other (Moderate to Severe)                                senna-docusate 8.6-50 MG per tablet   Commonly known as:  SENOKOT-S;PERICOLACE   Take 1-2 tablets by mouth 2 times daily Take while on oral narcotics to prevent or treat constipation.

## 2017-07-07 NOTE — DISCHARGE INSTRUCTIONS
[facility]   Adult Discharge Orders & Instructions     For 24 hours after surgery    1. Get plenty of rest.  A responsible adult must stay with you for at least 24 hours after you leave the hospital.   2. Do not drive or use heavy equipment.  If you have weakness or tingling, don't drive or use heavy equipment until this feeling goes away.  3. Do not drink alcohol.  4. Avoid strenuous or risky activities.  Ask for help when climbing stairs.   5. You may feel lightheaded.  IF so, sit for a few minutes before standing.  Have someone help you get up.   6. If you have nausea (feel sick to your stomach): Drink only clear liquids such as apple juice, ginger ale, broth or 7-Up.  Rest may also help.  Be sure to drink enough fluids.  Move to a regular diet as you feel able.  7. You may have a slight fever. Call the doctor if your fever is over 100 F (37.7 C) (taken under the tongue) or lasts longer than 24 hours.  8. You may have a dry mouth, a sore throat, muscle aches or trouble sleeping.  These should go away after 24 hours.  9. Do not make important or legal decisions.   Call your doctor for any of the followin.  Signs of infection (fever, growing tenderness at the surgery site, a large amount of drainage or bleeding, severe pain, foul-smelling drainage, redness, swelling).    2. It has been over 8 to 10 hours since surgery and you are still not able to urinate (pass water).    3.  Headache for over 24 hours.    4.  Numbness, tingling or weakness the day after surgery (if you had spinal anesthesia).  To contact a doctor, call ________________________________________        Discharge Instructions: Arthroscopy of the Shoulder    Diet    You have no restrictions in your diet.    During the evening following surgery, drink plenty of fluids and eat a light supper.   Nausea    The anesthesia you received may produce some nausea.     If nauseated, stay in bed, keep your head down, and try drinking fluids  such as 7-up, tea, or soup.  Discomfort    The amount of discomfort you feel is very unpredictable.    If you have pain that cannot be controlled with the prescription you may have been given, you should notify your doctor.     Some residual swelling and discomfort may occur for one or two weeks.    Applying an ice pack for 10 minutes at a time may help relieve pain and reduce swelling.     When applying the ice, be sure your shoulder dressing does not get wet. You can place plastic over the shoulder prior to applying the ice pack.   Drainage    You may expect drainage from the incisions on your shoulder.    Change the outer dressing in 3 days.    You may change the Band-Aids if they get soiled or wet. If you have white steri-strips across the incisions, leave them in place. They will fall off on their own in 7-10 days.   Activity    You can move your shoulder as tolerated after surgery.     Wear sling if instructed by your doctor.    No active sports, rotating of the shoulder or heavy lifting are advised for one week after surgery.    You may not drive the day of surgery because of the effects of anesthesia.     You can go back to work or school provided no problems such as significant increase or pain/swelling arise.     You may shower the day after surgery. Do not rub the incisions and pat dry.  Call your doctor if:  You have a large amount of bleeding drainage or severe pain.                                                                                                                                                                                                                                                                                                           REV.5/12          Discharge Instructions: Following Surgery on your Arm or Hand    Comfort:    Keep the affected arm or hand elevated to reduce pain and swelling. Use pillows at night and a sling (if ordered) during the day.    Take the pain  medication as ordered.     Care:    Keep the dressing clean, dry and intact.    Watch for drainage    Check color, motion, and sensation of the fingers/hand on a regular basis.     Activity:    Spend a quiet afternoon and evening at home on the day of your surgery.    No tub baths or showers until your dressing/cast is removed by your doctor.     Report to your doctor at once if:    Your fingers below the dressing/cast are numb, difficult or painful to move, and this is not relieved after elevation.    There is a change in the color of your fingers below the dressing/cast that does not go away when elevated.     The affected arm/hand is much cooler or warmer than the other side.    Your temperature is elevated.    There is an odor or unusual drainage on the dressing/cast.    Your dressing/cast is uncomfortably snug or tight.     Follow up:    Call your doctor s office to schedule a follow-up appointment     Rev. 3/2014

## 2017-07-07 NOTE — OP NOTE
DATE OF PROCEDURE: 7/7/2017     PREOPERATIVE DIAGNOSES:   1. Right rotator cuff tear.   2. Right long head biceps disease.   3. Right subacromial bursitis.   4. Supermorbid obesity    POSTOPERATIVE DIAGNOSES:   1. Right rotator cuff tear  2. Right long head biceps disease.  3. Right subacromial bursitis  4. Supermorbid obesity     PROCEDURES:   1. Right arthroscopic rotator cuff repair.   2. Right arthroscopic glenohumeral debridement, extensive  3. Right subacromial bursectomy without bony acromioplasty.     STAFF SURGEON: Daniela Garcia MD   ASSISTANT: Venkatesh Pimentel MD    NOTE ON COMPLEXITY: Modifier 22 is requested given the increased complexity and time needed to safely perform this procedure due to the patients BMI of 53.8    ANESTHESIA: General endotracheal anesthesia with supplementary interscalene block.   ESTIMATED BLOOD LOSS: 5 mL.     IMPLANTS: Arthrex 4.75 Bio-SwiveLock x 4.   COMPLICATIONS: None.     BRIEF PATIENT HISTORY: Karolyn Finney is a 46 year old female I have seen in clinic. Please refer to that documentation. She has an MRI which demonstrated a full thickness tear involving the supraspinatus. The patient has had nonoperative management including physical therapy courses. She has not had adequate lasting relief of pain and is continuing to have lifestyle limiting symptoms. She wishes at this time to proceed with surgical intervention. We had discussed the risks and benefits of both non-operative and surgical management. We discussed the expected postoperative restrictions. We discussed the risks of surgery including failure of the cuff to heal or risk of retear, risk of being no better or even worse if she  became stiff, infected, had an injury to the nerves or arteries which power the arm or hand or had a reaction to anesthesia. We discussed the postoperative rehabilitation course. The patient wished to proceed with surgery and informed consent was completed.    DESCRIPTION OF  PROCEDURE: The patient was identified in the preoperative area and the correct right shoulder was marked for surgery. The patient was provided an interscalene block by our Anesthesia colleagues and was taken to the operating room where she was surrendered to general endotracheal anesthesia. The patient was moved to the operating table in the beachchair position with all bony prominences well padded. The head was placed in a head german with the neck in neutral position. The right upper extremity was prepped and draped in the usual sterile fashion. A timeout was held in accordance with hospital policy, confirming correct patient, side, site, procedure and administration of IV antibiotics prior to incision.   I initiated shoulder arthroscopy through a posterior viewing portal. I created an anterior working portal under direct visualization. Portal placement was made more challenging by the patient's obese body habitus and careful attention and increased time was need to safely place all portals through the case. I performed a diagnostic arthroscopy. There was fraying and longitudinal tearing of the long head of the biceps with a degenerative superior labral tear. The subscapularis was intact. There was a frayed full thickness tear of the supraspinatus extending into the anterior infraspinatus. The teres minor was intact. There were no loose bodies in the axillary pouch. The chondral surfaces had only rare grade 1 changes.  I performed a tenotomy of the long head of the biceps and allowed it to retract out of the shoulder. I debrided the stump back to a stable edge. I debrided the anterior, superior and posterior labrum  I debrided the undersurface fraying of the retracted supraspinatus.   I moved to the subacromial space. Upon entering the subacromial space, massive and significant bursitis was encountered. I debrided this with a shaver and an ablator. I denuded the undersurface of all soft tissues including the CA  ligament.   I then set about repairing the rotator cuff. There was a posterior split towards the posterior interval and I placed 2 margin convergence sutures which better reduced the tear. I then was able to bring the tendon edge over to the greater tuberosity.  I debrided the greater tuberosity. I placed 2 medial 4.75 Bio-SwiveLock anchors. I passed the sutures using a FiberLink stitch. One limb of #2 FiberWire from the anterior anchor was tied to one limb of #2 FiberWire from the posterior anchor. All sutures were then brought laterally to 2 more 4.75 Bio-SwiveLock anchors. This nicely reapproximated the tendon to the greater tuberosity. All instruments were removed from the shoulder and then portals were closed with interrupted 3-0 Monocryl. Steri-Strips and a soft sterile dressing were applied. The arm was placed into an abduction sling and the patient was extubated and transported to the recovery room in stable condition. There were no apparent intraoperative complications.     POSTOPERATIVE PLAN:   1. The patient will be discharged to home when she meets Same Day discharge criteria.   2. The patient will have no range of motion of the shoulder for 6 weeks and can do active hand, wrist and elbow range of motion.   3. At 6 weeks, the patient will start passive and active assisted range of motion and progress to active range of motion over the following 6 weeks with strengthening at 3 months.     MONTSERRAT SHAH MD

## 2017-07-07 NOTE — ANESTHESIA POSTPROCEDURE EVALUATION
Patient: Karolyn Finney    Procedure(s):  Right Arthroscopic Rotator Cuff Repair, Biceps Tenotomy, Subacromial Decompression   - Wound Class: I-Clean    Diagnosis:Rotator Cuff Tear Right Shoulder   Diagnosis Additional Information: No value filed.    Anesthesia Type:  General, RSI, ETT    Note:  Anesthesia Post Evaluation    Patient location during evaluation: PACU  Patient participation: Able to participate in evaluation but full recovery from regional anesthesia has not yet ocurrred but is anticipated to occur within 48 hours  Level of consciousness: awake  Pain management: adequate  Airway patency: patent  Cardiovascular status: acceptable  Respiratory status: acceptable  Hydration status: acceptable  PONV: none     Anesthetic complications: None    Comments: Stable recovery noted.        Last vitals:  Vitals:    07/07/17 1230 07/07/17 1235 07/07/17 1240   BP: 138/79 132/76    Resp: 14 13 12   Temp:      SpO2: 97% 98% 96%         Electronically Signed By: Jd Sanchez MD  July 7, 2017  3:10 PM

## 2017-07-19 ENCOUNTER — OFFICE VISIT (OUTPATIENT)
Dept: ORTHOPEDICS | Facility: CLINIC | Age: 47
End: 2017-07-19

## 2017-07-19 DIAGNOSIS — M75.121 COMPLETE TEAR OF RIGHT ROTATOR CUFF: Primary | ICD-10-CM

## 2017-07-19 RX ORDER — OXYCODONE HYDROCHLORIDE 5 MG/1
5-10 TABLET ORAL
Qty: 30 TABLET | Refills: 0 | Status: SHIPPED | OUTPATIENT
Start: 2017-07-19 | End: 2017-10-18

## 2017-07-19 NOTE — LETTER
7/19/2017      RE: Karolyn Finney  [address]  [facility] [address]       SURGERY: Right Arthroscopic rotator cuff repair, biceps tenotomy, subacromial decompression  DOS: 07/07/17    HISTORY OF PRESENT ILLNESS: Pt reports generally doing well after the surgery. She continues to have anterior arm pain after the surgery that is amendable to Oxycodone. Patient is concerned about potential injury to arm after dropping it on day of surgery while changing her shirt. However, she reports that pain has been continually improving since her surgery. Patient was initially taking 5 mg oxycodone 4 times a day but now is taking 2 tablets at bedtime. Patient has continued to wear a shoulder immobilizer except when performing elbow and wrist range of motion exercises. Patient denies numbness or tingling in the affected extremity. Patient denies fever aches or chills.     PHYSICAL EXAMINATION:    GENERAL: Pleasant female comes in with shoulder and immobilizer, sister present in room  RESP: Respirations are even and unlabored.   MSK -RUE: Shoulder in immobilizer, incisions well healing without erythema or drainage, adequate range of motion in elbow and wrist, neurovascularly intact.    Imaging: No new imaging today    ASSESSMENT:   1. Status post right arthroscopic rotator cuff repair biceps tenotomy and subacromial decompression on 7/7/17.      PLAN:  We discussed patient's current pain regimen of two oxycodone tablets before bedtime. She was encouraged to continue to try to wean from the oxycodone. A refill  prescription for oxycodone was given. She was also encouraged to begin taking ibuprofen for the next week as well as to continue icing. She was instructed to take her shoulder out of the immobilizer to begin performing pendulum exercises. She is provided a prescription for physical therapy which she will start in 2 weeks. Patient will follow up in clinic in 4 weeks.     SHABBIR Burgess MD  Orthopedic Surgery  Intern    Attestation:   I have personally examined this patient and have reviewed the clinical presentation and progress note with the resident.  I agree with the treatment plan as outlined.  The plan was formulated with the resident on the day of the resident's note.     Daniela Garcia MD

## 2017-07-19 NOTE — LETTER
7/19/2017       RE: Karolyn Finney  [address]  [facility] [address]     Dear Colleague,    Thank you for referring your patient, Karolyn Finney, to the [facility] at [facility]. Please see a copy of my visit note below.    SURGERY: Right Arthroscopic rotator cuff repair, biceps tenotomy, subacromial decompression  DOS: 07/07/17    HISTORY OF PRESENT ILLNESS: Pt reports generally doing well after the surgery. She continues to have anterior arm pain after the surgery that is amendable to Oxycodone. Patient is concerned about potential injury to arm after dropping it on day of surgery while changing her shirt. However, she reports that pain has been continually improving since her surgery. Patient was initially taking 5 mg oxycodone 4 times a day but now is taking 2 tablets at bedtime. Patient has continued to wear a shoulder immobilizer except when performing elbow and wrist range of motion exercises. Patient denies numbness or tingling in the affected extremity. Patient denies fever aches or chills.     PHYSICAL EXAMINATION:    GENERAL: Pleasant female comes in with shoulder and immobilizer, sister present in room  RESP: Respirations are even and unlabored.   MSK -RUE: Shoulder in immobilizer, incisions well healing without erythema or drainage, adequate range of motion in elbow and wrist, neurovascularly intact.    Imaging: No new imaging today    ASSESSMENT:   1. Status post right arthroscopic rotator cuff repair biceps tenotomy and subacromial decompression on 7/7/17.      PLAN:  We discussed patient's current pain regimen of two oxycodone tablets before bedtime. She was encouraged to continue to try to wean from the oxycodone. A refill  prescription for oxycodone was given. She was also encouraged to begin taking ibuprofen for the next week as well as to continue icing. She was instructed to take her shoulder out of the immobilizer to  begin performing pendulum exercises. She is provided a prescription for physical therapy which she will start in 2 weeks. Patient will follow up in clinic in 4 weeks.     SHABBIR Burgess MD  Orthopedic Surgery Intern    Attestation:   I have personally examined this patient and have reviewed the clinical presentation and progress note with the resident.  I agree with the treatment plan as outlined.  The plan was formulated with the resident on the day of the resident's note.     Again, thank you for allowing me to participate in the care of your patient.      Sincerely,    Daniela Garcia MD

## 2017-07-19 NOTE — MR AVS SNAPSHOT
After Visit Summary   7/19/2017    Karolyn Finney    MRN: [id number]           Patient Information     Date Of Birth          1970        Visit Information        Provider Department      7/19/2017 1:45 PM Daniela Garcia MD [facility]        Today's Diagnoses     Complete tear of right rotator cuff    -  1       Follow-ups after your visit        Additional Services     PHYSICAL THERAPY REFERRAL (External-Prints)       Physical Therapy Referral                  Your next 10 appointments already scheduled     Aug 16, 2017  1:45 PM CDT   (Arrive by 1:30 PM)   RETURN SHOULDER with Daniela Garcia MD   [facility] ([facility])    [address]  [address] [facility] [address] [phone]              Who to contact     Please call your clinic at [phone] to:    Ask questions about your health    Make or cancel appointments    Discuss your medicines    Learn about your test results    Speak to your doctor   If you have compliments or concerns about an experience at your clinic, or if you wish to file a complaint, please contact [facility] Patient Relations at [phone] or email us at [email or web address]         Additional Information About Your Visit        MyChart Information     Zokemt gives you secure access to your electronic health record. If you see a primary care provider, you can also send messages to your care team and make appointments. If you have questions, please call your primary care clinic.  If you do not have a primary care provider, please call [phone] and they will assist you.      LendYour is an electronic gateway that provides easy, online access to your medical records. With LendYour, you can request a clinic appointment, read your test results, renew a prescription or communicate with your care team.     To access your existing  account, please contact your [facility] or call [phone] for assistance.        Care EveryWhere ID     This is your Care EveryWhere ID. This could be used by other organizations to access your [place] medical records  [id number]        Your Vitals Were     Last Period                   06/14/2013            Blood Pressure from Last 3 Encounters:   07/07/17 126/77   06/13/17 155/90   03/15/14 138/57    Weight from Last 3 Encounters:   07/07/17 (!) 145.2 kg (320 lb)   06/13/17 (!) 146.9 kg (323 lb 14.4 oz)   05/24/17 (!) 145.5 kg (320 lb 11.2 oz)              We Performed the Following     PHYSICAL THERAPY REFERRAL (External-Prints)          Today's Medication Changes          These changes are accurate as of: 7/19/17 11:59 PM.  If you have any questions, ask your nurse or doctor.               These medicines have changed or have updated prescriptions.        Dose/Directions    oxyCODONE 5 MG IR tablet   Commonly known as:  ROXICODONE   This may have changed:  when to take this   Used for:  Complete tear of right rotator cuff        Dose:  5-10 mg   Take 1-2 tablets (5-10 mg) by mouth every evening as needed for pain or other (Moderate to Severe)   Quantity:  30 tablet   Refills:  0            Where to get your medicines      Some of these will need a paper prescription and others can be bought over the counter.  Ask your nurse if you have questions.     Bring a paper prescription for each of these medications     oxyCODONE 5 MG IR tablet                Primary Care Provider Office Phone # Fax #    Torito Mcmullen [phone]       [facility] [address]  [facility] [address]        Equal Access to Services     LUANN MELCHOR : Francheska Jaimes, darlyn isaacs, qabrandin kaaaron bartlett. So [facility] [phone].    ATENCIÓN: Si habla español, tiene a james disposición servicios gratuitos de  asistencia lingüística. Sobia al [phone].    We comply with applicable federal civil rights laws and [place] laws. We do not discriminate on the basis of race, color, national origin, age, disability sex, sexual orientation or gender identity.            Thank you!     Thank you for choosing [facility]  for your care. Our goal is always to provide you with excellent care. Hearing back from our patients is one way we can continue to improve our services. Please take a few minutes to complete the written survey that you may receive in the mail after your visit with us. Thank you!             Your Updated Medication List - Protect others around you: Learn how to safely use, store and throw away your medicines at www.disposemymeds.org.          This list is accurate as of: 7/19/17 11:59 PM.  Always use your most recent med list.                   Brand Name Dispense Instructions for use Diagnosis    acetaminophen 325 MG tablet    TYLENOL    100 tablet    Take 2 tablets (650 mg) by mouth every 4 hours as needed for other (mild pain)    Complete tear of right rotator cuff       albuterol 108 (90 BASE) MCG/ACT Inhaler    VENTOLIN HFA    1 Inhaler    Inhale 1-2 puffs into the lungs every 4 hours as needed for shortness of breath / dyspnea. For shortness of breath/cough.    Moderate persistent asthma       fluticasone 110 MCG/ACT Inhaler    FLOVENT HFA    1 Inhaler    Inhale 2 puffs into the lungs 2 times daily.    Moderate persistent asthma       ondansetron 4 MG ODT tab    ZOFRAN-ODT    4 tablet    Take 1-2 tablets (4-8 mg) by mouth every 8 hours as needed for nausea Dissolve ON the tongue.    Complete tear of right rotator cuff       oxyCODONE 5 MG IR tablet    ROXICODONE    30 tablet    Take 1-2 tablets (5-10 mg) by mouth every evening as needed for pain or other (Moderate to Severe)    Complete tear of right rotator cuff       senna-docusate 8.6-50 MG per tablet    SENOKOT-S;PERICOLACE    30  tablet    Take 1-2 tablets by mouth 2 times daily Take while on oral narcotics to prevent or treat constipation.    Complete tear of right rotator cuff

## 2017-07-19 NOTE — PROGRESS NOTES
SURGERY: Right Arthroscopic rotator cuff repair, biceps tenotomy, subacromial decompression  DOS: 07/07/17    HISTORY OF PRESENT ILLNESS: Pt reports generally doing well after the surgery. She continues to have anterior arm pain after the surgery that is amendable to Oxycodone. Patient is concerned about potential injury to arm after dropping it on day of surgery while changing her shirt. However, she reports that pain has been continually improving since her surgery. Patient was initially taking 5 mg oxycodone 4 times a day but now is taking 2 tablets at bedtime. Patient has continued to wear a shoulder immobilizer except when performing elbow and wrist range of motion exercises. Patient denies numbness or tingling in the affected extremity. Patient denies fever aches or chills.     PHYSICAL EXAMINATION:    GENERAL: Pleasant female comes in with shoulder and immobilizer, sister present in room  RESP: Respirations are even and unlabored.   MSK -RUE: Shoulder in immobilizer, incisions well healing without erythema or drainage, adequate range of motion in elbow and wrist, neurovascularly intact.    Imaging: No new imaging today    ASSESSMENT:   1. Status post right arthroscopic rotator cuff repair biceps tenotomy and subacromial decompression on 7/7/17.      PLAN:  We discussed patient's current pain regimen of two oxycodone tablets before bedtime. She was encouraged to continue to try to wean from the oxycodone. A refill  prescription for oxycodone was given. She was also encouraged to begin taking ibuprofen for the next week as well as to continue icing. She was instructed to take her shoulder out of the immobilizer to begin performing pendulum exercises. She is provided a prescription for physical therapy which she will start in 2 weeks. Patient will follow up in clinic in 4 weeks.     SHABBIR Burgess MD  Orthopedic Surgery Intern    Attestation:   I have personally examined this patient and have reviewed the  clinical presentation and progress note with the resident.  I agree with the treatment plan as outlined.  The plan was formulated with the resident on the day of the resident's note.

## 2017-07-19 NOTE — NURSING NOTE
Reason For Visit:   Chief Complaint   Patient presents with     Surgical Followup     DOS: 7.7.17 for Right Arthroscopic Rotator Cuff Repair, Biceps Tenotomy, Subacromial Decompression.       PCP: Torito Mcmullen  Ref: Established    ?  No  Currently working? No.  Date of injury: 6.16  Type of injury: Fall.  Date of surgery: 7.7.17  Type of surgery: Right Arthroscopic Rotator Cuff Repair, Biceps Tenotomy, Subacromial Decompression  .  Smoker: No    Left hand dominant    SANE score  Affected shoulder: Right  Right shoulder SANE: 30  Left shoulder SANE: 100        Pain Assessment  Patient Currently in Pain: Yes  0-10 Pain Scale: 7  Primary Pain Location: Shoulder  Pain Orientation: Right  Other Pain Locations:  (Right arm)  Alleviating Factors: Pain medication  Aggravating Factors:  (sleeping)

## 2017-08-10 ENCOUNTER — TRANSFERRED RECORDS (OUTPATIENT)
Dept: HEALTH INFORMATION MANAGEMENT | Facility: CLINIC | Age: 47
End: 2017-08-10

## 2017-08-14 ENCOUNTER — OFFICE VISIT (OUTPATIENT)
Dept: ORTHOPEDICS | Facility: CLINIC | Age: 47
End: 2017-08-14
Payer: MEDICARE

## 2017-08-14 VITALS — OXYGEN SATURATION: 98 % | HEART RATE: 88 BPM | SYSTOLIC BLOOD PRESSURE: 132 MMHG | DIASTOLIC BLOOD PRESSURE: 91 MMHG

## 2017-08-14 DIAGNOSIS — M75.121 COMPLETE TEAR OF RIGHT ROTATOR CUFF: Primary | ICD-10-CM

## 2017-08-14 PROCEDURE — 99024 POSTOP FOLLOW-UP VISIT: CPT | Performed by: ORTHOPAEDIC SURGERY

## 2017-08-14 ASSESSMENT — PAIN SCALES - GENERAL: PAINLEVEL: MILD PAIN (3)

## 2017-08-14 NOTE — MR AVS SNAPSHOT
After Visit Summary   8/14/2017    Karolyn Finney    MRN: [id number]           Patient Information     Date Of Birth          1970        Visit Information        Provider Department      8/14/2017 1:00 PM Daniela Garcia MD [facility]        Today's Diagnoses     Complete tear of right rotator cuff    -  1       Follow-ups after your visit        Your next 10 appointments already scheduled     Sep 27, 2017 10:30 AM CDT   (Arrive by 10:15 AM)   RETURN SHOULDER with Daniela Garcia MD   [facility] ([facility])    [address]  [address] [facility] [address] [phone]              Who to contact     If you have questions or need follow up information about today's clinic visit or your schedule please contact [facility] directly at [phone].  Normal or non-critical lab and imaging results will be communicated to you by MyChart, letter or phone within 4 business days after the clinic has received the results. If you do not hear from us within 7 days, please contact the clinic through GENBANDhart or phone. If you have a critical or abnormal lab result, we will notify you by phone as soon as possible.  Submit refill requests through DEUS or call your pharmacy and they will forward the refill request to us. Please allow 3 business days for your refill to be completed.          Additional Information About Your Visit        MyChart Information     DEUS gives you secure access to your electronic health record. If you see a primary care provider, you can also send messages to your care team and make appointments. If you have questions, please call your primary care clinic.  If you do not have a primary care provider, please call [phone] and they will assist you.      DEUS is an electronic gateway that provides easy, online access to your medical records. With DEUS, you  can request a clinic appointment, read your test results, renew a prescription or communicate with your care team.     To access your existing account, please contact your [facility] or call [phone] for assistance.        Care EveryWhere ID     This is your Care EveryWhere ID. This could be used by other organizations to access your [place] medical records  [id number]        Your Vitals Were     Pulse Last Period Pulse Oximetry             88 06/14/2013 98%          Blood Pressure from Last 3 Encounters:   08/14/17 (!) 132/91   07/07/17 126/77   06/13/17 155/90    Weight from Last 3 Encounters:   07/07/17 (!) 145.2 kg (320 lb)   06/13/17 (!) 146.9 kg (323 lb 14.4 oz)   05/24/17 (!) 145.5 kg (320 lb 11.2 oz)              Today, you had the following     No orders found for display       Primary Care Provider Office Phone # Fax #    Torito Jamil Mcmullen [phone]       [facility] [address]  [place] [address]        Equal Access to Services     [facility]: Hadii aad ku hadasho Soomaali, waaxda luqadaha, qaybta kaalmada adeegyada, aaron wen . So [facility] [phone].    ATENCIÓN: Si habla español, tiene a james disposición servicios gratuitos de asistencia lingüística. [facility] [phone].    We comply with applicable federal civil rights laws and [place] laws. We do not discriminate on the basis of race, color, national origin, age, disability sex, sexual orientation or gender identity.            Thank you!     Thank you for choosing [facility]  for your care. Our goal is always to provide you with excellent care. Hearing back from our patients is one way we can continue to improve our services. Please take a few minutes to complete the written survey that you may receive in the mail after your visit with us. Thank you!             Your Updated Medication List - Protect others around you: Learn  how to safely use, store and throw away your medicines at www.disposemymeds.org.          This list is accurate as of: 8/14/17  1:47 PM.  Always use your most recent med list.                   Brand Name Dispense Instructions for use Diagnosis    acetaminophen 325 MG tablet    TYLENOL    100 tablet    Take 2 tablets (650 mg) by mouth every 4 hours as needed for other (mild pain)    Complete tear of right rotator cuff       albuterol 108 (90 BASE) MCG/ACT Inhaler    VENTOLIN HFA    1 Inhaler    Inhale 1-2 puffs into the lungs every 4 hours as needed for shortness of breath / dyspnea. For shortness of breath/cough.    Moderate persistent asthma       fluticasone 110 MCG/ACT Inhaler    FLOVENT HFA    1 Inhaler    Inhale 2 puffs into the lungs 2 times daily.    Moderate persistent asthma       ondansetron 4 MG ODT tab    ZOFRAN-ODT    4 tablet    Take 1-2 tablets (4-8 mg) by mouth every 8 hours as needed for nausea Dissolve ON the tongue.    Complete tear of right rotator cuff       oxyCODONE 5 MG IR tablet    ROXICODONE    30 tablet    Take 1-2 tablets (5-10 mg) by mouth every evening as needed for pain or other (Moderate to Severe)    Complete tear of right rotator cuff       senna-docusate 8.6-50 MG per tablet    SENOKOT-S;PERICOLACE    30 tablet    Take 1-2 tablets by mouth 2 times daily Take while on oral narcotics to prevent or treat constipation.    Complete tear of right rotator cuff

## 2017-08-14 NOTE — PROGRESS NOTES
SURGERY: Right Arthroscopic rotator cuff repair, biceps tenotomy, subacromial decompression  DOS: 07/07/17    HISTORY OF PRESENT ILLNESS: Ms. Anthony Finney is now 6 weeks status post the above procedure. She comes in today without her sling as she says that the thumb strap recently broke. She said she stopped wearing it about a week ago. She has been working with physical therapy and told her she already has very good range of motion.    PHYSICAL EXAMINATION:    GENERAL: Pleasant female in no acute distress.  RESP: Respirations are even and unlabored.   Focused upper extremity exam: Incisions all well healing without erythema or drainage. Motor intact to EPL, FPL and intrinsics. She demonstrates active assisted forward elevation to at least 90 .    Imaging: No new imaging today    ASSESSMENT:   1. Status post right arthroscopic rotator cuff repair biceps tenotomy and subacromial decompression on 7/7/17.      PLAN:  I reviewed the plan for physical therapy with the patient today. We discussed expectations for pain improvement and range of motion. She is comfortable with her current progress. I will plan to see her back in 6 weeks.

## 2017-08-14 NOTE — NURSING NOTE
"Karolyn Finney's goals for this visit include:   Chief Complaint   Patient presents with     RECHECK     6 wk s/p Right arthroscopic rotator cuff repair, biceps tenotomy, subacromial decompression, DOS: 7/7/17       She requests these members of her care team be copied on today's visit information:     PCP: Torito Mcmullen    Referring Provider:  ESTABLISHED PATIENT  No address on file    Chief Complaint   Patient presents with     RECHECK     6 wk s/p Right arthroscopic rotator cuff repair, biceps tenotomy, subacromial decompression, DOS: 7/7/17       Initial BP (!) 132/91 (BP Location: Left arm, Patient Position: Chair, Cuff Size: Adult Regular)  Pulse 88  LMP 06/14/2013  SpO2 98% Estimated body mass index is 53.25 kg/(m^2) as calculated from the following:    Height as of 7/7/17: 1.651 m (5' 5\").    Weight as of 7/7/17: 145.2 kg (320 lb).  Medication Reconciliation: complete    Do you need any medication refills at today's visit? No    Ana Lilia Swain LPN    "

## 2017-09-24 ENCOUNTER — HEALTH MAINTENANCE LETTER (OUTPATIENT)
Age: 47
End: 2017-09-24

## 2017-10-18 ENCOUNTER — OFFICE VISIT (OUTPATIENT)
Dept: ORTHOPEDICS | Facility: CLINIC | Age: 47
End: 2017-10-18

## 2017-10-18 DIAGNOSIS — M75.121 COMPLETE TEAR OF RIGHT ROTATOR CUFF: Primary | ICD-10-CM

## 2017-10-18 NOTE — LETTER
"10/18/2017      RE: Karolyn Finney  [address]  [facility] [address]       SURGERY: Right Arthroscopic rotator cuff repair, biceps tenotomy, subacromial decompression  DOS: 07/07/17    HISTORY OF PRESENT ILLNESS: Ms. Anthony Finney returns 3 months status post the above procedure. She says today that she had an event last Thursday when she tried to lift her grandson in front of her away from her body. Prior to that she had been progressing quite well and was doing \"wall walking\". Since then she has had more trouble with forward elevation. Has not been back to PT since.    PHYSICAL EXAMINATION:    GENERAL: Pleasant female in no acute distress.  RESP: Respirations are even and unlabored.   Focused upper extremity exam: Motor intact to EPL, FPL and intrinsics. She demonstrates active assisted forward elevation to perhaps 70  today and ER at side to 80.    Imaging: No new imaging today    ASSESSMENT:   1. Status post right arthroscopic rotator cuff repair biceps tenotomy and subacromial decompression on 7/7/17.      PLAN:  I reviewed with Karolyn that I would have her return to her rehab program but may need to return to more basic exercises such as table slides or passive supine gravity eliminated FE as opposed to wall walking. Hopefully she will be able to move past this exacerbation. She will return in 2 months and will call in the interim prn.    Daniela Garcia MD      "

## 2017-10-18 NOTE — MR AVS SNAPSHOT
After Visit Summary   10/18/2017    Karolyn Finney    MRN: [id number]           Patient Information     Date Of Birth          1970        Visit Information        Provider Department      10/18/2017 2:30 PM Daniela Garcia MD [facility]        Today's Diagnoses     Complete tear of right rotator cuff    -  1       Follow-ups after your visit        Your next 10 appointments already scheduled     Dec 27, 2017 12:45 PM CST   (Arrive by 12:30 PM)   RETURN SHOULDER with Daniela Garcia MD   [facility] ([facility])    [address] [place] [address] [id number] [phone]              Who to contact     Please call your clinic at [phone] to:    Ask questions about your health    Make or cancel appointments    Discuss your medicines    Learn about your test results    Speak to your doctor   If you have compliments or concerns about an experience at your clinic, or if you wish to file a complaint, please contact [facility] Patient Relations at [phone] or email us at [email or web address]         Additional Information About Your Visit        MyChart Information     [employer] gives you secure access to your electronic health record. If you see a primary care provider, you can also send messages to your care team and make appointments. If you have questions, please call your primary care clinic.  If you do not have a primary care provider, please call [phone] and they will assist you.      [employer] is an electronic gateway that provides easy, online access to your medical records. With [employer], you can request a clinic appointment, read your test results, renew a prescription or communicate with your care team.     To access your existing account, please contact your [facility] or call [phone] for assistance.        Care  EveryWhere ID     This is your Care EveryWhere ID. This could be used by other organizations to access your [place] medical records  [id number]        Your Vitals Were     Last Period                   06/14/2013            Blood Pressure from Last 3 Encounters:   08/14/17 (!) 132/91   07/07/17 126/77   06/13/17 155/90    Weight from Last 3 Encounters:   07/07/17 (!) 145.2 kg (320 lb)   06/13/17 (!) 146.9 kg (323 lb 14.4 oz)   05/24/17 (!) 145.5 kg (320 lb 11.2 oz)              Today, you had the following     No orders found for display         Today's Medication Changes          These changes are accurate as of: 10/18/17  3:34 PM.  If you have any questions, ask your nurse or doctor.               Stop taking these medicines if you haven't already. Please contact your care team if you have questions.     acetaminophen 325 MG tablet   Commonly known as:  TYLENOL           ondansetron 4 MG ODT tab   Commonly known as:  ZOFRAN-ODT           oxyCODONE 5 MG IR tablet   Commonly known as:  ROXICODONE           senna-docusate 8.6-50 MG per tablet   Commonly known as:  SENOKOT-S;PERICOLACE                    Primary Care Provider Office Phone # Fax #    Torito Jamil Mcmullen [phone]       [facility] [place] [address]  [place] [address]        Equal Access to Services     [facility] AH: Hadii aad ku hadasho Soomaali, waaxda luqadaha, qaybta kaalmada adefarzanehda, aaron wen . So [facility] [phone].    ATENCIÓN: Si habla español, tiene a james disposición servicios gratuitos de asistencia lingüística. Llame al [phone].    We comply with applicable federal civil rights laws and [place] laws. We do not discriminate on the basis of race, color, national origin, age, disability, sex, sexual orientation, or gender identity.            Thank you!     Thank you for choosing [facility]  for your care. Our goal is always to provide you with excellent care.  Hearing back from our patients is one way we can continue to improve our services. Please take a few minutes to complete the written survey that you may receive in the mail after your visit with us. Thank you!             Your Updated Medication List - Protect others around you: Learn how to safely use, store and throw away your medicines at www.disposemymeds.org.          This list is accurate as of: 10/18/17  3:34 PM.  Always use your most recent med list.                   Brand Name Dispense Instructions for use Diagnosis    albuterol 108 (90 BASE) MCG/ACT Inhaler    VENTOLIN HFA    1 Inhaler    Inhale 1-2 puffs into the lungs every 4 hours as needed for shortness of breath / dyspnea. For shortness of breath/cough.    Moderate persistent asthma       fluticasone 110 MCG/ACT Inhaler    FLOVENT HFA    1 Inhaler    Inhale 2 puffs into the lungs 2 times daily.    Moderate persistent asthma       IBUPROFEN PO      Take by mouth as needed for moderate pain

## 2017-10-18 NOTE — NURSING NOTE
Reason For Visit:   Chief Complaint   Patient presents with     Surgical Followup     DOS: 7.7.17 for Right Arthroscopic Rotator Cuff Repair, Biceps Tenotomy, Subacromial Decompression.          PCP: Torito Mcmullen  Ref: Established     ?  No  Currently working? No.  Date of injury: 6.16  Type of injury: Fall.  Date of surgery: 7.7.17  Type of surgery: Right Arthroscopic Rotator Cuff Repair, Biceps Tenotomy, Subacromial Decompression  .  Smoker: No     Left hand dominant    SANE score  Affected shoulder: Right  Right shoulder SANE: 35  Left shoulder SANE: 100        Pain Assessment  Patient Currently in Pain: Yes  0-10 Pain Scale: 7  Primary Pain Location: Shoulder  Pain Orientation: Right    Shyla Menezes LPN

## 2017-10-23 ENCOUNTER — TELEPHONE (OUTPATIENT)
Dept: ORTHOPEDICS | Facility: CLINIC | Age: 47
End: 2017-10-23

## 2017-10-23 NOTE — TELEPHONE ENCOUNTER
Patient states she woke up this morning with severe right shoulder pain.   She reports no known injury to the site. She is S/P right arthroscopic rotator cuff repair, biceps tenotomy, subacromial decompression 07/07/2017.    Recommendation is to rest the shoulder today.  She was encouraged to take over-the-counter analgesics and apply ice or heat as needed.  She can do the home exercises as tolerated.  Patient is agreeable with this.  She will call back if symptoms do not decrease or for any other new symptoms or concerns.

## 2017-12-27 ENCOUNTER — OFFICE VISIT (OUTPATIENT)
Dept: ORTHOPEDICS | Facility: CLINIC | Age: 47
End: 2017-12-27

## 2017-12-27 DIAGNOSIS — M75.121 COMPLETE TEAR OF RIGHT ROTATOR CUFF: Primary | ICD-10-CM

## 2017-12-27 NOTE — NURSING NOTE
Reason For Visit:   Chief Complaint   Patient presents with     Surgical Followup     Right arthroscopic rotator cuff repair, biceps tenotomy, subacromial decompression.  DOS: 7/7/17       PCP: Torito Mcmullen  Ref: LEESA FERNANDES    ?  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  Currently working? No.    Date of injury: 6/16/2017  Type of injury: fall  Date of surgery: 7/7/2017  Type of surgery:  1. Right arthroscopic rotator cuff repair.   2. Right arthroscopic glenohumeral debridement, extensive  3. Right subacromial bursectomy without bony acromioplasty.     Smoker: No  Request smoking cessation information: No    Left hand dominant    SANE score  Affected shoulder: Right  Right shoulder SANE: 25  Left shoulder SANE: 95    LMP 06/14/2013      Pain Assessment  Patient Currently in Pain: Yes  0-10 Pain Scale: 8  Primary Pain Location: Shoulder  Pain Orientation: Right  Pain Descriptors: Sharp, Shooting, Other (comment), Radiating (popping)  Alleviating Factors: NSAIDS  Aggravating Factors: Movement    Marissa Kelly, ATC

## 2017-12-27 NOTE — MR AVS SNAPSHOT
After Visit Summary   12/27/2017    Karolyn Finney    MRN: [id number]           Patient Information     Date Of Birth          1970        Visit Information        Provider Department      12/27/2017 12:45 PM Daniela Garcia MD [facility]        Today's Diagnoses     Complete tear of right rotator cuff    -  1       Follow-ups after your visit        Follow-up notes from your care team     Return if symptoms worsen or fail to improve.      Who to contact     Please call your clinic at [phone] to:    Ask questions about your health    Make or cancel appointments    Discuss your medicines    Learn about your test results    Speak to your doctor   If you have compliments or concerns about an experience at your clinic, or if you wish to file a complaint, please contact [facility] Patient Relations at [phone] or email us at [email or web address]         Additional Information About Your Visit        MyChart Information     [employer] gives you secure access to your electronic health record. If you see a primary care provider, you can also send messages to your care team and make appointments. If you have questions, please call your primary care clinic.  If you do not have a primary care provider, please call [phone] and they will assist you.      [employer] is an electronic gateway that provides easy, online access to your medical records. With [employer], you can request a clinic appointment, read your test results, renew a prescription or communicate with your care team.     To access your existing account, please contact your [facility] or call [phone] for assistance.        Care EveryWhere ID     This is your Care EveryWhere ID. This could be used by other organizations to access your [place] medical records  [id number]        Your Vitals Were     Last Period                    06/14/2013            Blood Pressure from Last 3 Encounters:   08/14/17 (!) 132/91   07/07/17 126/77   06/13/17 155/90    Weight from Last 3 Encounters:   07/07/17 (!) 145.2 kg (320 lb)   06/13/17 (!) 146.9 kg (323 lb 14.4 oz)   05/24/17 (!) 145.5 kg (320 lb 11.2 oz)              Today, you had the following     No orders found for display       Primary Care Provider Office Phone # Fax #    Torito Jamil Mcmullen [phone]       [facility] [address]  [place] [address]        Equal Access to Services     LUANN MELCHOR : Hadii aad ku hadasho Soomaali, waaxda luqadaha, qaybta kaalmada adeegyada, waxjorge idiin haycharissa wen . So [facility] [phone].    ATENCIÓN: Si habla español, tiene a james disposición servicios gratuitos de asistencia lingüística. Llame al [phone].    We comply with applicable federal civil rights laws and [place] laws. We do not discriminate on the basis of race, color, national origin, age, disability, sex, sexual orientation, or gender identity.            Thank you!     Thank you for choosing [facility]  for your care. Our goal is always to provide you with excellent care. Hearing back from our patients is one way we can continue to improve our services. Please take a few minutes to complete the written survey that you may receive in the mail after your visit with us. Thank you!             Your Updated Medication List - Protect others around you: Learn how to safely use, store and throw away your medicines at www.disposemymeds.org.          This list is accurate as of: 12/27/17  2:27 PM.  Always use your most recent med list.                   Brand Name Dispense Instructions for use Diagnosis    albuterol 108 (90 BASE) MCG/ACT Inhaler    VENTOLIN HFA    1 Inhaler    Inhale 1-2 puffs into the lungs every 4 hours as needed for shortness of breath / dyspnea. For shortness of breath/cough.    Moderate persistent asthma       ALEVE PO       Take 220 mg by mouth daily as needed for moderate pain        fluticasone 110 MCG/ACT Inhaler    FLOVENT HFA    1 Inhaler    Inhale 2 puffs into the lungs 2 times daily.    Moderate persistent asthma       IBUPROFEN PO      Take by mouth as needed for moderate pain

## 2017-12-27 NOTE — LETTER
12/27/2017       RE: Karolyn Finney  [address]  [facility] [address]     Dear Colleague,    Thank you for referring your patient, Karolyn Finney, to the [facility] at [facility]. Please see a copy of my visit note below.    SURGERY: Right Arthroscopic rotator cuff repair, biceps tenotomy, subacromial decompression  DOS: 07/07/17    HISTORY OF PRESENT ILLNESS: Ms. Anthony Finney is now 6 months status post the above procedure. She has continued to struggle with gaining any shoulder height AROM. Has clicking and pain. Has been doing home PT program for last 6-8 weeks at least.    PHYSICAL EXAMINATION:    GENERAL: Pleasant female in no acute distress. Accompanied by her daughter and grandson  RESP: Respirations are even and unlabored.   Focused upper extremity exam: Motor intact to EPL, FPL and intrinsics. She demonstrates active assisted forward elevation to at least 120  today and ER at side to 80.     IMAGING:   None new    ASSESSMENT:   1. Nearly 6 months status post right arthroscopic rotator cuff repair biceps tenotomy and subacromial decompression     PLAN:  I reviewed that she does have a risk of either the cuff failing to heal  versus having a recurrent tear.  Based on the quality of her rotator cuff tendon and bone at the time of her primary repair I would be pessimistic about the results of a revision cuff repair.  If a recurrent tear were noted we did discuss treatment options which surgically are somewhat limited.  We discussed that my recommendation at this time would be to maximize her potential with rehab.  I discussed the importance of periscapular stabilization and strengthening deltoid strengthening and then ongoing cuff strengthening.  She is not formally working with the physical therapist at this time and feels that she can continue a home program.  Therefore I provided for her images and instruction on a home program  which focuses on periscapular work.  We discussed the pluses and minuses of obtaining an MRI and that could be an option if she continues to struggle despite rehab.  She will keep us apprised of her progress and will follow-up as needed.    Again, thank you for allowing me to participate in the care of your patient.      Sincerely,    Daniela Garcia MD

## 2017-12-27 NOTE — PROGRESS NOTES
SURGERY: Right Arthroscopic rotator cuff repair, biceps tenotomy, subacromial decompression  DOS: 07/07/17    HISTORY OF PRESENT ILLNESS: Ms. Anthony Finney is now 6 months status post the above procedure. She has continued to struggle with gaining any shoulder height AROM. Has clicking and pain. Has been doing home PT program for last 6-8 weeks at least.    PHYSICAL EXAMINATION:    GENERAL: Pleasant female in no acute distress. Accompanied by her daughter and grandson  RESP: Respirations are even and unlabored.   Focused upper extremity exam: Motor intact to EPL, FPL and intrinsics. She demonstrates active assisted forward elevation to at least 120  today and ER at side to 80.     IMAGING:   None new    ASSESSMENT:   1. Nearly 6 months status post right arthroscopic rotator cuff repair biceps tenotomy and subacromial decompression     PLAN:  I reviewed that she does have a risk of either the cuff failing to heal  versus having a recurrent tear.  Based on the quality of her rotator cuff tendon and bone at the time of her primary repair I would be pessimistic about the results of a revision cuff repair.  If a recurrent tear were noted we did discuss treatment options which surgically are somewhat limited.  We discussed that my recommendation at this time would be to maximize her potential with rehab.  I discussed the importance of periscapular stabilization and strengthening deltoid strengthening and then ongoing cuff strengthening.  She is not formally working with the physical therapist at this time and feels that she can continue a home program.  Therefore I provided for her images and instruction on a home program which focuses on periscapular work.  We discussed the pluses and minuses of obtaining an MRI and that could be an option if she continues to struggle despite rehab.  She will keep us apprised of her progress and will follow-up as needed.

## 2018-01-01 NOTE — PROGRESS NOTES
"SURGERY: Right Arthroscopic rotator cuff repair, biceps tenotomy, subacromial decompression  DOS: 07/07/17    HISTORY OF PRESENT ILLNESS: Ms. Anthony Finney returns 3 months status post the above procedure. She says today that she had an event last Thursday when she tried to lift her grandson in front of her away from her body. Prior to that she had been progressing quite well and was doing \"wall walking\". Since then she has had more trouble with forward elevation. Has not been back to PT since.    PHYSICAL EXAMINATION:    GENERAL: Pleasant female in no acute distress.  RESP: Respirations are even and unlabored.   Focused upper extremity exam: Motor intact to EPL, FPL and intrinsics. She demonstrates active assisted forward elevation to perhaps 70  today and ER at side to 80.    Imaging: No new imaging today    ASSESSMENT:   1. Status post right arthroscopic rotator cuff repair biceps tenotomy and subacromial decompression on 7/7/17.      PLAN:  I reviewed with Karolyn that I would have her return to her rehab program but may need to return to more basic exercises such as table slides or passive supine gravity eliminated FE as opposed to wall walking. Hopefully she will be able to move past this exacerbation. She will return in 2 months and will call in the interim prn.  "
1

## 2019-11-09 ENCOUNTER — HEALTH MAINTENANCE LETTER (OUTPATIENT)
Age: 49
End: 2019-11-09

## 2020-02-23 ENCOUNTER — HEALTH MAINTENANCE LETTER (OUTPATIENT)
Age: 50
End: 2020-02-23

## 2020-12-06 ENCOUNTER — HEALTH MAINTENANCE LETTER (OUTPATIENT)
Age: 50
End: 2020-12-06

## 2021-02-20 ENCOUNTER — HEALTH MAINTENANCE LETTER (OUTPATIENT)
Age: 51
End: 2021-02-20

## 2021-09-26 ENCOUNTER — HEALTH MAINTENANCE LETTER (OUTPATIENT)
Age: 51
End: 2021-09-26

## 2022-01-15 ENCOUNTER — HEALTH MAINTENANCE LETTER (OUTPATIENT)
Age: 52
End: 2022-01-15

## 2022-03-12 ENCOUNTER — HEALTH MAINTENANCE LETTER (OUTPATIENT)
Age: 52
End: 2022-03-12

## 2023-04-23 ENCOUNTER — HEALTH MAINTENANCE LETTER (OUTPATIENT)
Age: 53
End: 2023-04-23

## (undated) DEVICE — TUBING SUCTION MEDI-VAC 1/4"X20' N620A

## (undated) DEVICE — SYR 10ML FINGER CONTROL W/O NDL 309695

## (undated) DEVICE — PREP DURAPREP 26ML APL 8630

## (undated) DEVICE — PAD ARMBOARD FOAM EGGCRATE COVIDEN 3114367

## (undated) DEVICE — NDL SPINAL 18GA 3.5" 405184

## (undated) DEVICE — SU MONOCRYL 2-0 SH 27" UND Y417H

## (undated) DEVICE — TUBING SET ARTHREX DUALWAVE OUTFLOW AR-6430

## (undated) DEVICE — SOL NACL 0.9% IRRIG 3000ML BAG 2B7477

## (undated) DEVICE — BUR SHAVER ARTHRO 6MM OVAL H9102

## (undated) DEVICE — NDL ARTHREX MULTIFIRE/FASTPASS SCORPION AR-13995N

## (undated) DEVICE — ARTHROSCOPIC CANNULA TWIST-IN PURPLE 7MMX7CM AR-6570

## (undated) DEVICE — SU TIGERTAPE 2MMX7"  AR-7237-7T

## (undated) DEVICE — NDL 18GA 1.5" 305196

## (undated) DEVICE — TUBING SYSTEM ARTHREX PUMP REDEUCE AR-6411

## (undated) DEVICE — DRAPE IOBAN INCISE 23X17" 6650EZ

## (undated) DEVICE — GLOVE PROTEXIS W/NEU-THERA 7.5  2D73TE75

## (undated) DEVICE — SU FIBERLINK #2 BRAIDED PB BLUE W/1.5" CLSD LOOP  AR-7235

## (undated) DEVICE — BLADE SHAVER ARTHRO 4.2MM GREAT WHITE 9299A

## (undated) DEVICE — SOL WATER IRRIG 1000ML BOTTLE 2F7114

## (undated) DEVICE — BRUSH SURGICAL SCRUB W/PCMX 4457A

## (undated) DEVICE — SOL HYDROGEN PEROXIDE 3% 4OZ BOTTLE F0010

## (undated) DEVICE — SUCTION MANIFOLD DORNOCH ULTRA CART UL-CL500

## (undated) DEVICE — TUBING SYSTEM ARTHREX PATIENT REDEUCE AR-6421

## (undated) DEVICE — STRAP KNEE/BODY 31143004

## (undated) DEVICE — IMM KIT SHOULDER TMAX MASK FACE 7210559

## (undated) DEVICE — DRAPE U-DRAPE 1015NSD NON-STERILE

## (undated) DEVICE — ESU ELEC VAPR PREMIER RF 90DEG 3.7MM 227204

## (undated) DEVICE — SU FIBERTAPE 2MM  AR-7237-7

## (undated) DEVICE — GLOVE PROTEXIS POWDER FREE 7.5 ORTHOPEDIC 2D73ET75

## (undated) DEVICE — LINEN ORTHO PACK 5446

## (undated) DEVICE — SU MONOCRYL 3-0 PS-1 27" Y936H

## (undated) DEVICE — PACK ARTHROSCOPY SHOULDER SPORTS SOP32SAFSR

## (undated) DEVICE — IMM KIT SHOULDER STABILIZATION 7210573

## (undated) RX ORDER — CLINDAMYCIN PHOSPHATE 900 MG/50ML
INJECTION, SOLUTION INTRAVENOUS
Status: DISPENSED
Start: 2017-07-07

## (undated) RX ORDER — FENTANYL CITRATE 50 UG/ML
INJECTION, SOLUTION INTRAMUSCULAR; INTRAVENOUS
Status: DISPENSED
Start: 2017-07-07

## (undated) RX ORDER — DEXAMETHASONE SODIUM PHOSPHATE 4 MG/ML
INJECTION, SOLUTION INTRA-ARTICULAR; INTRALESIONAL; INTRAMUSCULAR; INTRAVENOUS; SOFT TISSUE
Status: DISPENSED
Start: 2017-07-07

## (undated) RX ORDER — ONDANSETRON 2 MG/ML
INJECTION INTRAMUSCULAR; INTRAVENOUS
Status: DISPENSED
Start: 2017-07-07

## (undated) RX ORDER — LIDOCAINE HYDROCHLORIDE 20 MG/ML
INJECTION, SOLUTION EPIDURAL; INFILTRATION; INTRACAUDAL; PERINEURAL
Status: DISPENSED
Start: 2017-07-07